# Patient Record
Sex: FEMALE | Race: WHITE | Employment: UNEMPLOYED | URBAN - METROPOLITAN AREA
[De-identification: names, ages, dates, MRNs, and addresses within clinical notes are randomized per-mention and may not be internally consistent; named-entity substitution may affect disease eponyms.]

---

## 2023-04-13 LAB
BASOPHILS (10*3/UL) IN BLOOD BY AUTOMATED COUNT: 0.1 X10E9/L (ref 0–0.1)
BASOPHILS/100 LEUKOCYTES IN BLOOD BY AUTOMATED COUNT: 0.7 % (ref 0–2)
EOSINOPHILS (10*3/UL) IN BLOOD BY AUTOMATED COUNT: 0.17 X10E9/L (ref 0–0.4)
EOSINOPHILS/100 LEUKOCYTES IN BLOOD BY AUTOMATED COUNT: 1.3 % (ref 0–6)
ERYTHROCYTE DISTRIBUTION WIDTH (RATIO) BY AUTOMATED COUNT: 14.6 % (ref 11.5–14.5)
ERYTHROCYTE MEAN CORPUSCULAR HEMOGLOBIN CONCENTRATION (G/DL) BY AUTOMATED: 31.2 G/DL (ref 32–36)
ERYTHROCYTE MEAN CORPUSCULAR VOLUME (FL) BY AUTOMATED COUNT: 93 FL (ref 80–100)
ERYTHROCYTES (10*6/UL) IN BLOOD BY AUTOMATED COUNT: 4.29 X10E12/L (ref 4–5.2)
HEMATOCRIT (%) IN BLOOD BY AUTOMATED COUNT: 39.7 % (ref 36–46)
HEMOGLOBIN (G/DL) IN BLOOD: 12.4 G/DL (ref 12–16)
IGA (MG/DL) IN SER/PLAS: 212 MG/DL (ref 70–400)
IGE (IU/L) IN SERUM OR PLASMA: 118 IU/ML (ref 0–214)
IGG (MG/DL) IN SER/PLAS: 782 MG/DL (ref 700–1600)
IGM (MG/DL) IN SER/PLAS: 115 MG/DL (ref 40–230)
IMMATURE GRANULOCYTES/100 LEUKOCYTES IN BLOOD BY AUTOMATED COUNT: 1.2 % (ref 0–0.9)
LEUKOCYTES (10*3/UL) IN BLOOD BY AUTOMATED COUNT: 13.6 X10E9/L (ref 4.4–11.3)
LYMPHOCYTES (10*3/UL) IN BLOOD BY AUTOMATED COUNT: 4.7 X10E9/L (ref 0.8–3)
LYMPHOCYTES/100 LEUKOCYTES IN BLOOD BY AUTOMATED COUNT: 34.6 % (ref 13–44)
MONOCYTES (10*3/UL) IN BLOOD BY AUTOMATED COUNT: 1.14 X10E9/L (ref 0.05–0.8)
MONOCYTES/100 LEUKOCYTES IN BLOOD BY AUTOMATED COUNT: 8.4 % (ref 2–10)
NEUTROPHILS (10*3/UL) IN BLOOD BY AUTOMATED COUNT: 7.32 X10E9/L (ref 1.6–5.5)
NEUTROPHILS/100 LEUKOCYTES IN BLOOD BY AUTOMATED COUNT: 53.8 % (ref 40–80)
NRBC (PER 100 WBCS) BY AUTOMATED COUNT: 0 /100 WBC (ref 0–0)
PLATELETS (10*3/UL) IN BLOOD AUTOMATED COUNT: 390 X10E9/L (ref 150–450)

## 2023-04-17 LAB
PNEUMO SEROTYPE INTERPRETATION: NORMAL
SEROTYPE 1, VIRC: 1.35 UG/ML
SEROTYPE 12F, VIRC: 0.2 UG/ML
SEROTYPE 14, VIRC: 6.8 UG/ML
SEROTYPE 18C(56), VIRC: 2.99 UG/ML
SEROTYPE 19F, VIRC: 1.25 UG/ML
SEROTYPE 23F, VIRC: 0.03 UG/ML
SEROTYPE 3, VIRC: 0.37 UG/ML
SEROTYPE 4, VIRC: 0.09 UG/ML
SEROTYPE 5, VIRC: 1.4 UG/ML
SEROTYPE 6B(26), VIRC: 2.14 UG/ML
SEROTYPE 7F(51), VIRC: 0.88 UG/ML
SEROTYPE 8, VIRC: 0.23 UG/ML
SEROTYPE 9N, VIRC: 0.68 UG/ML
SEROTYPE 9V(68), VIRC: 0.56 UG/ML

## 2023-04-21 LAB
ASPER.FUM.MIX GEL DIFFUSION, VIRC: POSITIVE
ASPER.FUMIGATUS IGE, VIRC: 5.48 KU/L
ASPER.FUMIGATUS IGG, VIRC: 17.2 MCG/ML
CLASS ASPER.FUMIGATUS, VIRC: 3
IGE, VIRC: 131 IU/ML (ref 3–48)

## 2023-05-05 LAB
ALANINE AMINOTRANSFERASE (SGPT) (U/L) IN SER/PLAS: 20 U/L (ref 7–45)
ALBUMIN (G/DL) IN SER/PLAS: 4 G/DL (ref 3.4–5)
ALKALINE PHOSPHATASE (U/L) IN SER/PLAS: 50 U/L (ref 33–136)
ANION GAP IN SER/PLAS: 10 MMOL/L (ref 10–20)
ASPARTATE AMINOTRANSFERASE (SGOT) (U/L) IN SER/PLAS: 19 U/L (ref 9–39)
BILIRUBIN TOTAL (MG/DL) IN SER/PLAS: 0.3 MG/DL (ref 0–1.2)
CALCIDIOL (25 OH VITAMIN D3) (NG/ML) IN SER/PLAS: 37 NG/ML
CALCIUM (MG/DL) IN SER/PLAS: 9.5 MG/DL (ref 8.6–10.6)
CARBON DIOXIDE, TOTAL (MMOL/L) IN SER/PLAS: 28 MMOL/L (ref 21–32)
CHLORIDE (MMOL/L) IN SER/PLAS: 104 MMOL/L (ref 98–107)
CREATININE (MG/DL) IN SER/PLAS: 0.79 MG/DL (ref 0.5–1.05)
ERYTHROCYTE DISTRIBUTION WIDTH (RATIO) BY AUTOMATED COUNT: 15.1 % (ref 11.5–14.5)
ERYTHROCYTE MEAN CORPUSCULAR HEMOGLOBIN CONCENTRATION (G/DL) BY AUTOMATED: 31.9 G/DL (ref 32–36)
ERYTHROCYTE MEAN CORPUSCULAR VOLUME (FL) BY AUTOMATED COUNT: 92 FL (ref 80–100)
ERYTHROCYTES (10*6/UL) IN BLOOD BY AUTOMATED COUNT: 3.53 X10E12/L (ref 4–5.2)
GFR FEMALE: 78 ML/MIN/1.73M2
GLUCOSE (MG/DL) IN SER/PLAS: 89 MG/DL (ref 74–99)
HEMATOCRIT (%) IN BLOOD BY AUTOMATED COUNT: 32.6 % (ref 36–46)
HEMOGLOBIN (G/DL) IN BLOOD: 10.4 G/DL (ref 12–16)
LEUKOCYTES (10*3/UL) IN BLOOD BY AUTOMATED COUNT: 7.7 X10E9/L (ref 4.4–11.3)
NRBC (PER 100 WBCS) BY AUTOMATED COUNT: 0 /100 WBC (ref 0–0)
PLATELETS (10*3/UL) IN BLOOD AUTOMATED COUNT: 235 X10E9/L (ref 150–450)
POTASSIUM (MMOL/L) IN SER/PLAS: 3.9 MMOL/L (ref 3.5–5.3)
PROTEIN TOTAL: 6 G/DL (ref 6.4–8.2)
SODIUM (MMOL/L) IN SER/PLAS: 138 MMOL/L (ref 136–145)
UREA NITROGEN (MG/DL) IN SER/PLAS: 11 MG/DL (ref 6–23)

## 2023-08-25 ENCOUNTER — APPOINTMENT (OUTPATIENT)
Dept: PRIMARY CARE | Facility: CLINIC | Age: 78
End: 2023-08-25
Payer: MEDICARE

## 2023-09-05 ENCOUNTER — APPOINTMENT (OUTPATIENT)
Dept: PRIMARY CARE | Facility: CLINIC | Age: 78
End: 2023-09-05
Payer: MEDICARE

## 2023-09-07 ENCOUNTER — OFFICE VISIT (OUTPATIENT)
Dept: PRIMARY CARE | Facility: CLINIC | Age: 78
End: 2023-09-07
Payer: MEDICARE

## 2023-09-07 VITALS — DIASTOLIC BLOOD PRESSURE: 76 MMHG | SYSTOLIC BLOOD PRESSURE: 131 MMHG

## 2023-09-07 DIAGNOSIS — K21.9 GASTROESOPHAGEAL REFLUX DISEASE WITHOUT ESOPHAGITIS: ICD-10-CM

## 2023-09-07 DIAGNOSIS — J30.1 ALLERGIC RHINITIS DUE TO POLLEN, UNSPECIFIED SEASONALITY: ICD-10-CM

## 2023-09-07 DIAGNOSIS — G25.0 BENIGN ESSENTIAL TREMOR: ICD-10-CM

## 2023-09-07 DIAGNOSIS — L57.8 SOLAR DERMATITIS: Primary | ICD-10-CM

## 2023-09-07 PROCEDURE — 1126F AMNT PAIN NOTED NONE PRSNT: CPT | Performed by: INTERNAL MEDICINE

## 2023-09-07 PROCEDURE — 99203 OFFICE O/P NEW LOW 30 MIN: CPT | Performed by: INTERNAL MEDICINE

## 2023-09-07 NOTE — PROGRESS NOTES
Subjective   Patient ID: Juan Alberto Alcantara is a 77 y.o. female who presents for No chief complaint on file..    HPI met patient for first time see updated front sheet has had multiple issues regarding her breathing sees Dr. TINAJERO is on a regimen of Advair daily Spiriva Allegra also on oral medications for her stomach is concerned about her blood sugar and her cholesterol no chest pain no shortness of breath    Past medical history asthma GERD fatty liver  Hyperlipidemia depression osteoporosis    Medications noted and unchanged albuterol prolia fluoxetine advair protonix crestor    Allergies noted and unchanged penicillin    Social history no tobacco    Prevention some exercise some prior blood work reviewed    Review of Systems    Objective   There were no vitals taken for this visit.  Vital signs noted alert and oriented x3 NCAT no coryza nares without discharge OP benign no JVD no thyromegaly chest clear to auscultation and percussion no wheezing CV regular rate and rhythm S1-S2 without murmur gallop or rub extremities no clubbing cyanosis or edema normal distal pulses musculoskeletal resting tremor in her left upper extremity  Physical Exam    Assessment/Plan impression all diagnoses benign essential tremor GERD  Plan we will reschedule for complete physical examination and blood work (some completed 05.23 and some 12.22) watch diet avoid nsaids continue with ppi no particular intervention is needed for tremor, but will follow up with neuro see below avoid excess caffeine   Referral to ENT referral to dermatology referral to plastic surgery referral for neurology then recheck based on above

## 2023-10-02 ENCOUNTER — APPOINTMENT (OUTPATIENT)
Dept: PRIMARY CARE | Facility: CLINIC | Age: 78
End: 2023-10-02
Payer: MEDICARE

## 2023-10-06 PROBLEM — K76.0 FATTY LIVER: Status: ACTIVE | Noted: 2023-10-06

## 2023-10-06 PROBLEM — H25.13 NUCLEAR SENILE CATARACT OF BOTH EYES: Status: ACTIVE | Noted: 2021-05-03

## 2023-10-06 PROBLEM — K21.9 GASTRO-ESOPHAGEAL REFLUX DISEASE WITHOUT ESOPHAGITIS: Status: ACTIVE | Noted: 2018-12-31

## 2023-10-06 PROBLEM — H43.813 POSTERIOR VITREOUS DETACHMENT OF BOTH EYES: Status: ACTIVE | Noted: 2021-05-03

## 2023-10-06 PROBLEM — F33.41 RECURRENT MAJOR DEPRESSIVE DISORDER, IN PARTIAL REMISSION (CMS-HCC): Status: ACTIVE | Noted: 2018-05-23

## 2023-10-06 PROBLEM — H35.373 EPIRETINAL MEMBRANE (ERM) OF BOTH EYES: Status: ACTIVE | Noted: 2021-05-03

## 2023-10-06 PROBLEM — H90.A31 MIXED CONDUCTIVE AND SENSORINEURAL HEARING LOSS OF RIGHT EAR WITH RESTRICTED HEARING OF LEFT EAR: Status: ACTIVE | Noted: 2018-10-01

## 2023-10-06 PROBLEM — N39.0 RECURRENT UTI: Status: ACTIVE | Noted: 2023-10-06

## 2023-10-06 PROBLEM — E66.9 OBESITY, CLASS I, BMI 30-34.9: Status: ACTIVE | Noted: 2018-10-24

## 2023-10-06 PROBLEM — F32.9 MAJOR DEPRESSIVE DISORDER, SINGLE EPISODE, UNSPECIFIED: Status: ACTIVE | Noted: 2018-12-31

## 2023-10-06 PROBLEM — M51.24 HERNIATION OF THORACIC INTERVERTEBRAL DISC WITHOUT MYELOPATHY: Status: ACTIVE | Noted: 2021-06-09

## 2023-10-06 PROBLEM — H16.223 KERATOCONJUNCTIVITIS SICCA OF BOTH EYES NOT SPECIFIED AS SJOGREN'S: Status: ACTIVE | Noted: 2021-05-03

## 2023-10-06 PROBLEM — F32.A DEPRESSION: Status: ACTIVE | Noted: 2023-10-06

## 2023-10-06 PROBLEM — R73.03 PREDIABETES: Status: ACTIVE | Noted: 2023-10-06

## 2023-10-06 PROBLEM — C44.90 SKIN CANCER: Status: ACTIVE | Noted: 2023-10-06

## 2023-10-06 PROBLEM — H52.4 PRESBYOPIA: Status: ACTIVE | Noted: 2021-05-03

## 2023-10-06 PROBLEM — E66.811 OBESITY, CLASS I, BMI 30-34.9: Status: ACTIVE | Noted: 2018-10-24

## 2023-10-06 PROBLEM — J45.909 ASTHMA (HHS-HCC): Status: ACTIVE | Noted: 2023-10-06

## 2023-10-06 RX ORDER — TRETINOIN 0.5 MG/G
CREAM TOPICAL NIGHTLY
COMMUNITY
Start: 2022-12-08 | End: 2024-01-17 | Stop reason: WASHOUT

## 2023-10-06 RX ORDER — AZELASTINE 1 MG/ML
2 SPRAY, METERED NASAL 2 TIMES DAILY
COMMUNITY
Start: 2023-05-24

## 2023-10-06 RX ORDER — FLUOXETINE HYDROCHLORIDE 20 MG/1
20 CAPSULE ORAL DAILY
COMMUNITY
End: 2024-01-18 | Stop reason: ALTCHOICE

## 2023-10-06 RX ORDER — DICYCLOMINE HYDROCHLORIDE 10 MG/1
1 CAPSULE ORAL 3 TIMES DAILY PRN
COMMUNITY
Start: 2022-11-01 | End: 2024-01-17 | Stop reason: WASHOUT

## 2023-10-06 RX ORDER — DENOSUMAB 60 MG/ML
INJECTION SUBCUTANEOUS
COMMUNITY

## 2023-10-06 RX ORDER — PANTOPRAZOLE SODIUM 40 MG/1
1 TABLET, DELAYED RELEASE ORAL DAILY
COMMUNITY
Start: 2016-06-03

## 2023-10-06 RX ORDER — NITROFURANTOIN 25; 75 MG/1; MG/1
1 CAPSULE ORAL
COMMUNITY
Start: 2023-01-22 | End: 2023-10-17 | Stop reason: SDUPTHER

## 2023-10-06 RX ORDER — FLUTICASONE PROPIONATE AND SALMETEROL 100; 50 UG/1; UG/1
1 POWDER RESPIRATORY (INHALATION)
COMMUNITY
Start: 2014-09-29 | End: 2024-01-17 | Stop reason: WASHOUT

## 2023-10-06 RX ORDER — ROSUVASTATIN CALCIUM 20 MG/1
1 TABLET, COATED ORAL DAILY
COMMUNITY
End: 2024-01-18 | Stop reason: SDUPTHER

## 2023-10-06 RX ORDER — RALOXIFENE HYDROCHLORIDE 60 MG/1
60 TABLET, FILM COATED ORAL DAILY
COMMUNITY
End: 2024-01-17 | Stop reason: WASHOUT

## 2023-10-06 RX ORDER — FLUTICASONE PROPIONATE AND SALMETEROL 50; 250 UG/1; UG/1
1 POWDER RESPIRATORY (INHALATION)
COMMUNITY

## 2023-10-06 RX ORDER — MINERAL OIL
1 ENEMA (ML) RECTAL DAILY
COMMUNITY
Start: 2014-09-29

## 2023-10-06 RX ORDER — FLUTICASONE FUROATE 27.5 UG/1
2 SPRAY, METERED NASAL
COMMUNITY
End: 2024-01-17 | Stop reason: WASHOUT

## 2023-10-06 RX ORDER — METRONIDAZOLE 7.5 MG/G
CREAM TOPICAL
COMMUNITY
End: 2024-01-17 | Stop reason: WASHOUT

## 2023-10-06 RX ORDER — LEVOFLOXACIN 500 MG/1
1 TABLET, FILM COATED ORAL DAILY
COMMUNITY
Start: 2009-03-20 | End: 2024-01-17 | Stop reason: WASHOUT

## 2023-10-06 RX ORDER — MULTIVIT-MIN/FA/LYCOPEN/LUTEIN .4-300-25
TABLET ORAL
COMMUNITY

## 2023-10-06 RX ORDER — GUAIFENESIN 600 MG/1
2 TABLET, EXTENDED RELEASE ORAL EVERY 12 HOURS
COMMUNITY
Start: 2009-03-19 | End: 2024-01-17 | Stop reason: WASHOUT

## 2023-10-06 RX ORDER — ESTRADIOL 0.1 MG/G
CREAM VAGINAL 3 TIMES WEEKLY
COMMUNITY
Start: 2022-11-08 | End: 2024-01-17 | Stop reason: WASHOUT

## 2023-10-06 RX ORDER — FLUOXETINE HYDROCHLORIDE 40 MG/1
1 CAPSULE ORAL DAILY
COMMUNITY
End: 2023-10-13 | Stop reason: SDUPTHER

## 2023-10-06 RX ORDER — ALBUTEROL SULFATE 90 UG/1
2 AEROSOL, METERED RESPIRATORY (INHALATION) EVERY 4 HOURS PRN
COMMUNITY
Start: 2014-12-16

## 2023-10-13 DIAGNOSIS — Z00.00 HEALTH CARE MAINTENANCE: Primary | ICD-10-CM

## 2023-10-14 RX ORDER — FLUOXETINE HYDROCHLORIDE 40 MG/1
40 CAPSULE ORAL DAILY
Qty: 90 CAPSULE | Refills: 0 | Status: SHIPPED | OUTPATIENT
Start: 2023-10-14 | End: 2023-10-16 | Stop reason: SDUPTHER

## 2023-10-16 DIAGNOSIS — Z00.00 HEALTH CARE MAINTENANCE: ICD-10-CM

## 2023-10-16 RX ORDER — FLUOXETINE HYDROCHLORIDE 40 MG/1
40 CAPSULE ORAL DAILY
Qty: 90 CAPSULE | Refills: 0 | Status: SHIPPED | OUTPATIENT
Start: 2023-10-16 | End: 2024-01-18 | Stop reason: SDUPTHER

## 2023-10-17 DIAGNOSIS — N39.0 RECURRENT UTI: ICD-10-CM

## 2023-10-18 ENCOUNTER — LAB (OUTPATIENT)
Dept: LAB | Facility: LAB | Age: 78
End: 2023-10-18
Payer: MEDICARE

## 2023-10-18 DIAGNOSIS — N39.0 RECURRENT UTI: ICD-10-CM

## 2023-10-18 DIAGNOSIS — R19.7 DIARRHEA, UNSPECIFIED TYPE: ICD-10-CM

## 2023-10-18 PROCEDURE — 87086 URINE CULTURE/COLONY COUNT: CPT

## 2023-10-18 PROCEDURE — 87186 SC STD MICRODIL/AGAR DIL: CPT

## 2023-10-18 RX ORDER — NITROFURANTOIN 25; 75 MG/1; MG/1
100 CAPSULE ORAL 2 TIMES DAILY
Qty: 14 CAPSULE | Refills: 0 | Status: SHIPPED | OUTPATIENT
Start: 2023-10-18 | End: 2023-10-25

## 2023-10-20 LAB — BACTERIA UR CULT: ABNORMAL

## 2023-10-23 ENCOUNTER — APPOINTMENT (OUTPATIENT)
Dept: PRIMARY CARE | Facility: CLINIC | Age: 78
End: 2023-10-23
Payer: MEDICARE

## 2023-10-26 ENCOUNTER — APPOINTMENT (OUTPATIENT)
Dept: PRIMARY CARE | Facility: CLINIC | Age: 78
End: 2023-10-26
Payer: MEDICARE

## 2023-10-26 DIAGNOSIS — N39.0 RECURRENT UTI: ICD-10-CM

## 2023-10-26 DIAGNOSIS — M81.0 OSTEOPOROSIS, UNSPECIFIED OSTEOPOROSIS TYPE, UNSPECIFIED PATHOLOGICAL FRACTURE PRESENCE: ICD-10-CM

## 2023-10-26 DIAGNOSIS — M89.9 DISORDER OF BONE, UNSPECIFIED: ICD-10-CM

## 2023-10-27 RX ORDER — CIPROFLOXACIN 500 MG/1
500 TABLET ORAL 2 TIMES DAILY
Qty: 14 TABLET | Refills: 0 | Status: SHIPPED | OUTPATIENT
Start: 2023-10-27 | End: 2023-11-03

## 2023-11-02 ENCOUNTER — LAB (OUTPATIENT)
Dept: LAB | Facility: LAB | Age: 78
End: 2023-11-02
Payer: MEDICARE

## 2023-11-02 DIAGNOSIS — M89.9 DISORDER OF BONE, UNSPECIFIED: ICD-10-CM

## 2023-11-02 DIAGNOSIS — M81.0 OSTEOPOROSIS, UNSPECIFIED OSTEOPOROSIS TYPE, UNSPECIFIED PATHOLOGICAL FRACTURE PRESENCE: ICD-10-CM

## 2023-11-02 LAB
25(OH)D3 SERPL-MCNC: 28 NG/ML (ref 30–100)
ALBUMIN SERPL BCP-MCNC: 4.4 G/DL (ref 3.4–5)
ALP SERPL-CCNC: 62 U/L (ref 33–136)
ALT SERPL W P-5'-P-CCNC: 22 U/L (ref 7–45)
ANION GAP SERPL CALC-SCNC: 14 MMOL/L (ref 10–20)
AST SERPL W P-5'-P-CCNC: 19 U/L (ref 9–39)
BILIRUB SERPL-MCNC: 0.4 MG/DL (ref 0–1.2)
BUN SERPL-MCNC: 17 MG/DL (ref 6–23)
CALCIUM SERPL-MCNC: 9.5 MG/DL (ref 8.6–10.6)
CHLORIDE SERPL-SCNC: 104 MMOL/L (ref 98–107)
CO2 SERPL-SCNC: 25 MMOL/L (ref 21–32)
CREAT SERPL-MCNC: 0.79 MG/DL (ref 0.5–1.05)
ERYTHROCYTE [DISTWIDTH] IN BLOOD BY AUTOMATED COUNT: 13.2 % (ref 11.5–14.5)
GFR SERPL CREATININE-BSD FRML MDRD: 78 ML/MIN/1.73M*2
GLUCOSE SERPL-MCNC: 94 MG/DL (ref 74–99)
HCT VFR BLD AUTO: 36.8 % (ref 36–46)
HGB BLD-MCNC: 11.6 G/DL (ref 12–16)
MCH RBC QN AUTO: 30.1 PG (ref 26–34)
MCHC RBC AUTO-ENTMCNC: 31.5 G/DL (ref 32–36)
MCV RBC AUTO: 96 FL (ref 80–100)
NRBC BLD-RTO: 0 /100 WBCS (ref 0–0)
PLATELET # BLD AUTO: 281 X10*3/UL (ref 150–450)
POTASSIUM SERPL-SCNC: 4.4 MMOL/L (ref 3.5–5.3)
PROT SERPL-MCNC: 6.8 G/DL (ref 6.4–8.2)
RBC # BLD AUTO: 3.85 X10*6/UL (ref 4–5.2)
SODIUM SERPL-SCNC: 139 MMOL/L (ref 136–145)
WBC # BLD AUTO: 8.5 X10*3/UL (ref 4.4–11.3)

## 2023-11-02 PROCEDURE — 80053 COMPREHEN METABOLIC PANEL: CPT

## 2023-11-02 PROCEDURE — 82306 VITAMIN D 25 HYDROXY: CPT

## 2023-11-02 PROCEDURE — 85027 COMPLETE CBC AUTOMATED: CPT

## 2023-11-02 PROCEDURE — 36415 COLL VENOUS BLD VENIPUNCTURE: CPT

## 2023-12-13 NOTE — PROGRESS NOTES
FOLLOW-UP VISIT     HISTORY OF PRESENT ILLNESS:   Juan Alberto Alcantara is a 78 y.o. female who was last seen 23 presents for follow up today. At her last visit we discussed using d-mannose and vaginal estrogen for her UTIs, she declined to use estrogen at that time.     She has been feeling well. She explains that she has had to call us to have her antibiotic prescribed for a UTI. She is still concerned with estrogen and breast cancer.     She also has hemorrhoids that do make her voiding painful. Her stools are not hard but she feels that she goes through cycles where her bowel movements become hard, but nothing like the constipation she felt 20 years ago. Toward the end of the cycle she ends up with terrible diarrhea.       PAST MEDICAL HISTORY:  Past Medical History:   Diagnosis Date    Acute vaginitis 2014    Bacterial vaginosis    Encounter for other preprocedural examination 2018    Preoperative testing    Other abnormal and inconclusive findings on diagnostic imaging of breast 2017    Abnormal finding on breast imaging    Other specified disorders of breast 2019    Radial scar of breast    Personal history of other benign neoplasm 2019    History of other benign neoplasm    Personal history of other diseases of the respiratory system 2014    Personal history of asthma    Personal history of other malignant neoplasm of skin 2014    History of squamous cell carcinoma of skin    Personal history of other malignant neoplasm of skin 2014    History of basal cell carcinoma    Personal history of other specified conditions 12/15/2014    History of dysuria       PAST SURGICAL HISTORY:  Past Surgical History:   Procedure Laterality Date     SECTION, CLASSIC  2014     Section    HYSTERECTOMY  2014    Hysterectomy    OTHER SURGICAL HISTORY  2019    Breast biopsy excisional    OTHER SURGICAL HISTORY  2019    Breast biopsy core needle     SINUS SURGERY  09/12/2014    Sinus Surgery    TOTAL ABDOMINAL HYSTERECTOMY W/ BILATERAL SALPINGOOPHORECTOMY  09/12/2014    Salpingo-oophorectomy Bilateral        ALLERGIES:   Allergies   Allergen Reactions    Adhesive Tape-Silicones Other and Rash     Skin becomes red and painful    Hydromorphone (Bulk) Other     Upset stomach  Vomiting    Penicillins Unknown and Rash     Ineffective and does not feel well    House Dust Unknown    Hydromorphone Other    Mold Unknown        MEDICATIONS:   [unfilled]     SOCIAL HISTORY:  Patient     Social History     Socioeconomic History    Marital status:      Spouse name: Not on file    Number of children: Not on file    Years of education: Not on file    Highest education level: Not on file   Occupational History    Not on file   Tobacco Use    Smoking status: Not on file    Smokeless tobacco: Not on file   Substance and Sexual Activity    Alcohol use: Not on file    Drug use: Not on file    Sexual activity: Not on file   Other Topics Concern    Not on file   Social History Narrative    Not on file     Social Determinants of Health     Financial Resource Strain: Not on file   Food Insecurity: Not on file   Transportation Needs: Not on file   Physical Activity: Not on file   Stress: Not on file   Social Connections: Not on file   Intimate Partner Violence: Not on file   Housing Stability: Not on file       FAMILY HISTORY:  Family History   Problem Relation Name Age of Onset    Breast cancer Mother      Other (Cardiac Disorder) Father      Colon cancer Maternal Grandfather      Breast cancer Other Paternal Aunt     Lung cancer Sibling      Thyroid cancer Sibling         REVIEW OF SYSTEMS:  Constitutional: Negative for fever and chills. Denies anorexia, weight loss.  Eyes: Negative for visual disturbance.   Respiratory: Negative for shortness of breath.    Cardiovascular: Negative for chest pain.   Gastrointestinal: Negative for nausea and vomiting.   Genitourinary: See  interval history above.  Skin: Negative for rash.   Neurological: Negative for dizziness and numbness.   Psychiatric/Behavioral: Negative for confusion and decreased concentration.     PHYSICAL EXAM:  There were no vitals taken for this visit.  Constitutional: Patient appears well-developed and well-nourished. No distress.    Head: Normocephalic and atraumatic.    Neck: Normal range of motion.    Cardiovascular: Normal rate.    Pulmonary/Chest: Effort normal. No respiratory distress.     Musculoskeletal: Normal range of motion.    Neurological: Alert and oriented to person, place, and time.  Psychiatric: Normal mood and affect. Behavior is normal. Thought content normal.      : atrophic vaginal mucosa         Assessment:      No diagnosis found.    Juan Alberto Alcantara is a 78 y.o. female with recurrent UTIs      She is doing well. We made sure she has a standing order for a UA and refilled her prophylactic macrobid. She also requested referrals to a new internal medicine and ENT today.     Plan:   -Refilled prophylactic macrobid  -Standing UA    Follow up will be scheduled appropriately.     Scribe Attestation  By signing my name below, IClaire Scribe   attest that this documentation has been prepared under the direction and in the presence of Serina Matthew MD MPH.

## 2023-12-14 ENCOUNTER — OFFICE VISIT (OUTPATIENT)
Dept: UROLOGY | Facility: CLINIC | Age: 78
End: 2023-12-14
Payer: MEDICARE

## 2023-12-14 VITALS
TEMPERATURE: 97.7 F | DIASTOLIC BLOOD PRESSURE: 79 MMHG | HEART RATE: 87 BPM | SYSTOLIC BLOOD PRESSURE: 115 MMHG | BODY MASS INDEX: 28.34 KG/M2 | WEIGHT: 154 LBS | HEIGHT: 62 IN

## 2023-12-14 DIAGNOSIS — Z00.00 HEALTH CARE MAINTENANCE: ICD-10-CM

## 2023-12-14 DIAGNOSIS — Z01.419 WELL WOMAN EXAM: ICD-10-CM

## 2023-12-14 DIAGNOSIS — N39.0 RECURRENT UTI: Primary | ICD-10-CM

## 2023-12-14 LAB
POC APPEARANCE, URINE: CLEAR
POC BILIRUBIN, URINE: NEGATIVE
POC BLOOD, URINE: NEGATIVE
POC COLOR, URINE: YELLOW
POC GLUCOSE, URINE: NEGATIVE MG/DL
POC KETONES, URINE: NEGATIVE MG/DL
POC LEUKOCYTES, URINE: ABNORMAL
POC NITRITE,URINE: NEGATIVE
POC PH, URINE: 5 PH
POC PROTEIN, URINE: NEGATIVE MG/DL
POC SPECIFIC GRAVITY, URINE: 1.02
POC UROBILINOGEN, URINE: 0.2 EU/DL

## 2023-12-14 PROCEDURE — 1036F TOBACCO NON-USER: CPT | Performed by: OBSTETRICS & GYNECOLOGY

## 2023-12-14 PROCEDURE — 81002 URINALYSIS NONAUTO W/O SCOPE: CPT | Performed by: OBSTETRICS & GYNECOLOGY

## 2023-12-14 PROCEDURE — 99215 OFFICE O/P EST HI 40 MIN: CPT | Performed by: OBSTETRICS & GYNECOLOGY

## 2023-12-14 PROCEDURE — 1126F AMNT PAIN NOTED NONE PRSNT: CPT | Performed by: OBSTETRICS & GYNECOLOGY

## 2023-12-14 RX ORDER — NITROFURANTOIN 25; 75 MG/1; MG/1
CAPSULE ORAL
Qty: 60 CAPSULE | Refills: 0 | Status: SHIPPED | OUTPATIENT
Start: 2023-12-14

## 2023-12-14 ASSESSMENT — PAIN SCALES - GENERAL: PAINLEVEL: 0-NO PAIN

## 2023-12-26 ENCOUNTER — APPOINTMENT (OUTPATIENT)
Dept: SURGICAL ONCOLOGY | Facility: CLINIC | Age: 78
End: 2023-12-26
Payer: MEDICARE

## 2023-12-26 ENCOUNTER — APPOINTMENT (OUTPATIENT)
Dept: RADIOLOGY | Facility: CLINIC | Age: 78
End: 2023-12-26
Payer: MEDICARE

## 2023-12-27 NOTE — PROGRESS NOTES
Juan Alberto Alcantara female   1945 78 y.o.   [id number]           HPI  Juan Alberto Alcantara is a 78 y.o. 77 year old  [facility]. 2016 right breast ultrasound core biopsy evidenced intraductal papilloma, sclerosing adenosis and apocrine metaplasia. This was not initially surgically excised. Followed imaging was performed 2017 & 2017 when it was decided she wanted to proceed with excision biopsy performed by Dr Denis Pimentel 2017 showing complex sclerosing lesion, usual ductal hyperplasia and apocrine metaplasia without atypia. 2018 right ultrasound core biopsy of subareolar mass noted sclerosing lesion, with intraductal papilloma, usual ductal hyperplasia & apocrine metaplasia. 2018 right magseed excision with Denis Pimentel noted intraductal papilloma, flat epithelial atypia and PASH. She has history of remote breast benign biopsy. She took Raloxifene for 12 year for bone loss. She has family history of breast cancer in her mother age 42 and 2 paternal aunts & paternal niece.     She wants to continue [facility] care in the [facility] with clinical breast exams.      BREAST IMAGIN2022 Screening mammogram, BI-RADS Category 2.     REPRODUCTIVE HISTORY: Menarche age 13, first birth age 30, menopause age 57, MOE/BSO, no HRT, >1 benign breast biopsies, scattered fibroglandular breast tissue     FAMILY CANCER HISTORY:  Mother: breast cancer age 42  Paternal aunt (1): breast cancer  Paternal aunt (2): breast cancer  Paternal niece: breast cancer  Maternal grandfather: colon cancer   Brother: lung cancer       REVIEW OF SYSTEMS    Constitutional:  Negative for appetite change, fatigue, fever and unexpected weight change.   HENT:  Negative for ear pain, hearing loss, nosebleeds, sore throat and trouble swallowing.    Eyes:  Negative for discharge, itching and visual disturbance.   Respiratory:  Negative for cough, chest  tightness and shortness of breath.    Cardiovascular:  Negative for chest pain, palpitations and leg swelling.   Breast: as indicated in HPI  Gastrointestinal:  Negative for abdominal pain, constipation, diarrhea and nausea.   Endocrine: Negative for cold intolerance and heat intolerance.   Genitourinary:  Negative for dysuria, frequency, hematuria, pelvic pain and vaginal bleeding.   Musculoskeletal:  Negative for arthralgias, back pain, gait problem, joint swelling and myalgias.   Skin:  Negative for color change and rash.   Allergic/Immunologic: Negative for environmental allergies and food allergies.   Neurological:  Negative for dizziness, tremors, speech difficulty, weakness, numbness and headaches.   Hematological:  Does not bruise/bleed easily.   Psychiatric/Behavioral:  Negative for agitation, dysphoric mood and sleep disturbance. The patient is not nervous/anxious.         MEDICATIONS  Current Outpatient Medications   Medication Instructions    Advair Diskus 250-50 mcg/dose diskus inhaler 1 puff, inhalation, 2 times daily RT    albuterol 90 mcg/actuation inhaler 2 puffs, inhalation, Every 4 hours PRN, As instructed    azelastine (Astelin) 137 mcg (0.1 %) nasal spray 2 sprays, Each Nostril, 2 times daily    D-MANNOSE ORAL 1 capsule, oral, Daily, 350 mg    denosumab (Prolia) 60 mg/mL syringe subcutaneous    dicyclomine (Bentyl) 10 mg capsule 1 capsule, oral, 3 times daily PRN    estradiol (Estrace) 0.01 % (0.1 mg/gram) vaginal cream vaginal, 3 times weekly, APPLY A PEA-SIZED AMOUNT TO VAGINAL OPENING EVERY MONDAY, WEDNESDAY, AND FRIDAY EVENING.<BR>    fexofenadine (Allegra) 180 mg tablet 1 tablet, oral, Daily    FLUoxetine (PROZAC) 20 mg, oral, Daily    FLUoxetine (PROZAC) 40 mg, oral, Daily    fluticasone (Flonase Sensimist) 27.5 mcg/actuation nasal spray 2 sprays, Each Nostril, Daily RT    fluticasone propion-salmeteroL (Advair Diskus) 100-50 mcg/dose diskus inhaler 1 puff, inhalation, 2 times daily RT,  Rinse and gargle mouth with water after each use. Uses 250 mcg/dose now.<BR>    guaiFENesin (Mucinex) 600 mg 12 hr tablet 2 tablets, oral, Every 12 hours    levoFLOXacin (Levaquin) 500 mg tablet 1 tablet, oral, Daily    LYCOPENE ORAL Centrum Silver    metroNIDAZOLE (Metrocream) 0.75 % cream Topical    multivitamin with minerals iron-free (Centrum Silver) oral    nitrofurantoin, macrocrystal-monohydrate, (Macrobid) 100 mg capsule Take one tablet PRN (diarrhea) to prevent UTI    pantoprazole (ProtoNix) 40 mg EC tablet 1 tablet, oral, Daily, Take 1 hour before breakfast     raloxifene (EVISTA) 60 mg, oral, Daily    rosuvastatin (Crestor) 20 mg tablet 1 tablet, oral, Daily    tretinoin (Retin-A) 0.05 % cream Topical, Nightly, Apply to affected areas. Start 3 nights a week and increase use as tolerated        ALLERGIES  Allergies   Allergen Reactions    Adhesive Tape-Silicones Other and Rash     Skin becomes red and painful    Hydromorphone (Bulk) Other     Upset stomach  Vomiting    Penicillins Unknown and Rash     Ineffective and does not feel well    House Dust Unknown    Hydromorphone Other    Mold Unknown        SOCIAL HISTORY    Family History   Problem Relation Name Age of Onset    Breast cancer Mother      Other (Cardiac Disorder) Father      Colon cancer Maternal Grandfather      Breast cancer Other Paternal Aunt     Lung cancer Sibling      Thyroid cancer Sibling           Social History     Tobacco Use    Smoking status: Never    Smokeless tobacco: Never   Substance Use Topics    Alcohol use: Never        VITALS  There were no vitals filed for this visit.     PHYSICAL EXAM  Patient is alert and oriented x3, with appropriate mood. Her gait is steady and hand grasps are equal. Sclera clear. The breasts are asymmetrical, left with more ptosis. Right breast has healed partial circumareolar incision from excisional biopsy. The tissue is soft without palpable abnormalities, discrete nodules or masses. The skin and  nipples appear normal. There is no cervical, supraclavicular, or axillary lymphadenopathy palpable. Heart rate and rhythm normal, S1 and S2 appreciated. The lungs are clear bilaterally. Abdomen is soft, non-tender.    Physical Exam  Chest:              IMAGING      Time was spent viewing digital images of the radiology testing with the patient. I explained the results in depth, along with suggested explanation for follow up recommendations based on the testing results.          ORDERS  No orders of the defined types were placed in this encounter.          ASSESSMENT/PLAN  No diagnosis found.     No follow-ups on file.      Olesya Varghese, [facility]

## 2023-12-29 ENCOUNTER — APPOINTMENT (OUTPATIENT)
Dept: SURGICAL ONCOLOGY | Facility: CLINIC | Age: 78
End: 2023-12-29
Payer: MEDICARE

## 2023-12-29 ENCOUNTER — APPOINTMENT (OUTPATIENT)
Dept: RADIOLOGY | Facility: CLINIC | Age: 78
End: 2023-12-29
Payer: MEDICARE

## 2023-12-29 NOTE — PROGRESS NOTES
TO do see that this lady    Juan Alberto Alcantara female   1945 78 y.o.   [id number]      Chief Complaint    Follow-up          HPI  Juan Alberto Alcantara is a 78 y.o. year old  female [facility]. 2016 right breast ultrasound core biopsy evidenced intraductal papilloma, sclerosing adenosis and apocrine metaplasia. This was not initially surgically excised. Followed imaging was performed 2017 & 2017 when it was decided she wanted to proceed with excision biopsy performed by Dr Denis Pimentel 2017 showing complex sclerosing lesion, usual ductal hyperplasia and apocrine metaplasia without atypia. 2018 right ultrasound core biopsy of subareolar mass noted sclerosing lesion, with intraductal papilloma, usual ductal hyperplasia & apocrine metaplasia. 2018 right magseed excision with Denis Pimentel noted intraductal papilloma, flat epithelial atypia and PASH. She has history of remote breast benign biopsy. She took Raloxifene for 12 years for bone loss. She has family history of breast cancer in her mother age 42 and 2 paternal aunts & paternal niece.     She wants to continue annual care in the [facility] with clinical breast exams.      BREAST IMAGIN2022 Screening mammogram, BI-RADS Category 2.     REPRODUCTIVE HISTORY: Menarche age 13, first birth age 30, menopause age 57, MOE/BSO, no HRT, >1 benign breast biopsies, scattered fibroglandular breast tissue     FAMILY CANCER HISTORY:  Mother: breast cancer age 42  Paternal aunt (1): breast cancer  Paternal aunt (2): breast cancer  Paternal niece: breast cancer  Maternal grandfather: colon cancer   Brother: lung cancer       REVIEW OF SYSTEMS    Constitutional:  Negative for appetite change, fatigue, fever and unexpected weight change.   HENT:  Negative for ear pain, hearing loss, nosebleeds, sore throat and trouble swallowing.    Eyes:  Negative for discharge, itching and visual  disturbance.   Respiratory:  Negative for cough, chest tightness and shortness of breath.    Cardiovascular:  Negative for chest pain, palpitations and leg swelling.   Breast: as indicated in HPI  Gastrointestinal:  Negative for abdominal pain, constipation, diarrhea and nausea.   Endocrine: Negative for cold intolerance and heat intolerance.   Genitourinary:  Negative for dysuria, frequency, hematuria, pelvic pain and vaginal bleeding.   Musculoskeletal:  Negative for arthralgias, back pain, gait problem, joint swelling and myalgias.   Skin:  Negative for color change and rash.   Allergic/Immunologic: Negative for environmental allergies and food allergies.   Neurological:  Negative for dizziness, tremors, speech difficulty, weakness, numbness and headaches.   Hematological:  Does not bruise/bleed easily.   Psychiatric/Behavioral:  Negative for agitation, dysphoric mood and sleep disturbance. The patient is not nervous/anxious.         MEDICATIONS  Current Outpatient Medications   Medication Instructions    Advair Diskus 250-50 mcg/dose diskus inhaler 1 puff, inhalation, 2 times daily RT    albuterol 90 mcg/actuation inhaler 2 puffs, inhalation, Every 4 hours PRN, As instructed    azelastine (Astelin) 137 mcg (0.1 %) nasal spray 2 sprays, Each Nostril, 2 times daily    denosumab (Prolia) 60 mg/mL syringe subcutaneous    fexofenadine (Allegra) 180 mg tablet 1 tablet, oral, Daily    FLUoxetine (PROZAC) 40 mg, oral, Daily    FLUoxetine (PROZAC) 40 mg, oral, Daily    LYCOPENE ORAL Centrum Silver    multivitamin with minerals iron-free (Centrum Silver) oral    nitrofurantoin, macrocrystal-monohydrate, (Macrobid) 100 mg capsule Take one tablet PRN (diarrhea) to prevent UTI    pantoprazole (ProtoNix) 40 mg EC tablet 1 tablet, oral, Daily, Take 1 hour before breakfast     propranolol (INDERAL) 20 mg, oral, 2 times daily    rosuvastatin (CRESTOR) 20 mg, oral, Daily    rosuvastatin (CRESTOR) 20 mg, oral, Daily         ALLERGIES  Allergies   Allergen Reactions    Adhesive Tape-Silicones Other and Rash     Skin becomes red and painful    Hydromorphone (Bulk) Other     Upset stomach  Vomiting    Penicillins Unknown and Rash     Ineffective and does not feel well    House Dust Unknown    Hydromorphone Other    Mold Unknown        SOCIAL HISTORY    Family History   Problem Relation Name Age of Onset    Breast cancer Mother      Other (Cardiac Disorder) Father      Colon cancer Maternal Grandfather      Breast cancer Other Paternal Aunt     Lung cancer Sibling      Thyroid cancer Sibling           Social History     Tobacco Use    Smoking status: Never    Smokeless tobacco: Never   Substance Use Topics    Alcohol use: Never        VITALS  Vitals:    01/23/24 1253   BP: 126/83   Pulse: 75        PHYSICAL EXAM  Patient is alert and oriented x3, with appropriate mood. Her gait is steady and hand grasps are equal. Sclera clear. The breasts are asymmetrical, left with more ptosis. Right breast has healed partial circumareolar incision from excisional biopsy. The tissue is soft without palpable abnormalities, discrete nodules or masses. The skin and nipples appear normal. There is no cervical, supraclavicular, or axillary lymphadenopathy palpable. Heart rate and rhythm normal, S1 and S2 appreciated. The lungs are clear bilaterally. Abdomen is soft, non-tender.     Physical Exam  Chest:              IMAGING    Bilateral screening mammogram, BI-RADS Category 2.     Time was spent viewing digital images of the radiology testing with the patient. I explained the results in depth, along with suggested explanation for follow up recommendations based on the testing results.          ORDERS  Orders Placed This Encounter   Procedures    BI mammo bilateral screening tomosynthesis     Standing Status:   Future     Standing Expiration Date:   3/22/2025     Order Specific Question:   Perform a breast ultrasound if clinically indicated by Radiologist?      Answer:   Yes     Order Specific Question:   Previous Mamm performed at  location?     Answer:   Yes     Order Specific Question:   Reason for exam:     Answer:   screening     Order Specific Question:   Radiologist to Determine Optimal Study     Answer:   Yes     Order Specific Question:   Release result to [employer]     Answer:   Immediate     Order Specific Question:   Is this exam part of a Research Study? If Yes, link this order to the research study     Answer:   No           ASSESSMENT/PLAN  1. Healthcare maintenance  BI mammo bilateral screening tomosynthesis      2. At high risk for breast cancer  Clinic Appointment Request           Follow up in about 1 year (around 1/23/2025), or with clinic screen.      Olesya Varghese, [facility] [place]

## 2024-01-04 ENCOUNTER — APPOINTMENT (OUTPATIENT)
Dept: RADIOLOGY | Facility: CLINIC | Age: 79
End: 2024-01-04
Payer: MEDICARE

## 2024-01-18 ENCOUNTER — OFFICE VISIT (OUTPATIENT)
Dept: PRIMARY CARE | Facility: CLINIC | Age: 79
End: 2024-01-18
Payer: MEDICARE

## 2024-01-18 VITALS
RESPIRATION RATE: 16 BRPM | SYSTOLIC BLOOD PRESSURE: 130 MMHG | HEIGHT: 62 IN | DIASTOLIC BLOOD PRESSURE: 80 MMHG | HEART RATE: 74 BPM | BODY MASS INDEX: 28.52 KG/M2 | TEMPERATURE: 98.3 F | WEIGHT: 155 LBS

## 2024-01-18 DIAGNOSIS — K21.00 GASTROESOPHAGEAL REFLUX DISEASE WITH ESOPHAGITIS, UNSPECIFIED WHETHER HEMORRHAGE: ICD-10-CM

## 2024-01-18 DIAGNOSIS — E78.49 OTHER HYPERLIPIDEMIA: Primary | ICD-10-CM

## 2024-01-18 DIAGNOSIS — Z00.00 HEALTH CARE MAINTENANCE: ICD-10-CM

## 2024-01-18 DIAGNOSIS — H90.A31 MIXED CONDUCTIVE AND SENSORINEURAL HEARING LOSS OF RIGHT EAR WITH RESTRICTED HEARING OF LEFT EAR: ICD-10-CM

## 2024-01-18 DIAGNOSIS — E78.49 OTHER HYPERLIPIDEMIA: ICD-10-CM

## 2024-01-18 DIAGNOSIS — J45.40 MODERATE PERSISTENT ASTHMA, UNSPECIFIED WHETHER COMPLICATED (HHS-HCC): ICD-10-CM

## 2024-01-18 DIAGNOSIS — F33.41 RECURRENT MAJOR DEPRESSIVE DISORDER, IN PARTIAL REMISSION (CMS-HCC): ICD-10-CM

## 2024-01-18 DIAGNOSIS — G25.0 BENIGN ESSENTIAL TREMOR: ICD-10-CM

## 2024-01-18 DIAGNOSIS — F41.9 ANXIETY: ICD-10-CM

## 2024-01-18 PROCEDURE — 99215 OFFICE O/P EST HI 40 MIN: CPT | Performed by: INTERNAL MEDICINE

## 2024-01-18 PROCEDURE — 1036F TOBACCO NON-USER: CPT | Performed by: INTERNAL MEDICINE

## 2024-01-18 PROCEDURE — 1126F AMNT PAIN NOTED NONE PRSNT: CPT | Performed by: INTERNAL MEDICINE

## 2024-01-18 PROCEDURE — 1159F MED LIST DOCD IN RCRD: CPT | Performed by: INTERNAL MEDICINE

## 2024-01-18 RX ORDER — FLUOXETINE HYDROCHLORIDE 40 MG/1
40 CAPSULE ORAL DAILY
Qty: 90 CAPSULE | Refills: 0 | Status: SHIPPED | OUTPATIENT
Start: 2024-01-18

## 2024-01-18 RX ORDER — ROSUVASTATIN CALCIUM 20 MG/1
20 TABLET, COATED ORAL DAILY
Qty: 90 TABLET | Refills: 3 | Status: SHIPPED | OUTPATIENT
Start: 2024-01-18

## 2024-01-18 RX ORDER — PROPRANOLOL HYDROCHLORIDE 20 MG/1
20 TABLET ORAL 2 TIMES DAILY
Qty: 60 TABLET | Refills: 5 | Status: SHIPPED | OUTPATIENT
Start: 2024-01-18 | End: 2024-07-16

## 2024-01-18 RX ORDER — FLUOXETINE HYDROCHLORIDE 40 MG/1
40 CAPSULE ORAL DAILY
Qty: 90 CAPSULE | Refills: 3 | Status: SHIPPED | OUTPATIENT
Start: 2024-01-18

## 2024-01-18 RX ORDER — ROSUVASTATIN CALCIUM 20 MG/1
20 TABLET, COATED ORAL DAILY
Qty: 90 TABLET | Refills: 0 | Status: SHIPPED | OUTPATIENT
Start: 2024-01-18 | End: 2024-01-23 | Stop reason: SDUPTHER

## 2024-01-18 ASSESSMENT — ENCOUNTER SYMPTOMS
DEPRESSION: 0
OCCASIONAL FEELINGS OF UNSTEADINESS: 0
LOSS OF SENSATION IN FEET: 0

## 2024-01-18 NOTE — PROGRESS NOTES
Juan Alberto Alcantara is a 78 y.o. female   NPV    Patient with a past medical history of osteoporosis on Prolia (DEXA 2024), Breast Ca followed by breast team, HLD, controlled anxiety/depression, recurrent UTI, Asthma, Mammogram due, Colonoscopy is up to date, stress incontinence, essential tremors, IBS (diarrhea)    Patient comes in for a routine check  Complains of chronic asthma which is fairly well-controlled with Advair  The asthma is mostly allergy induced    Also gets recurrent UTIs  Probably exacerbated by her IBS with diarrhea    Has been diagnosed with essential tremors that have been getting worse lately    Feels fine  No chest pain/  SOB/ dizziness  BM OK  Energy level ok  Appetite OK             Review of Systems     Constitutional: not feeling poorly, no fever, no recent weight gain and no recent weight loss.   Eyes: no blurred vision and no diplopia.   ENT: Hearing loss  Cardiovascular: no chest pain, no tightness or heavy pressure, no shortness of breath, no palpitations and no lower extremity edema.   Respiratory: Asthma  Gastrointestinal: no change in bowel habits, no constipation, no bloody stools, no nausea, no vomiting, no abdominal pain, no signs and symptoms of ulcer disease, no ari colored stools and no intolerance to fatty foods.   Genitourinary: Frequent UTIs  Musculoskeletal: no arthralgias, no joint stiffness, no muscle weakness, no back pain and no difficulty walking.   Skin: no rashes, no change in skin color and pigmentation, no skin lesions and no skin lumps.   Neurological: no headaches, no dizziness, no seizures, no tingling, no numbness, no signs and symptoms of stroke and no limb weakness.   Psychiatric: no confusion, no memory lapses or loss, no depression and no sleep disturbances.   Endocrine: no goiter, no thyroid disorder, no diabetes mellitus, no excessive thirst, no dry skin, no cold intolerance, no heat intolerance and no increased urinary frequency.   Hematologic/Lymphatic:  is not slow to heal, does not bleed easily, does not bruise easily, no thrombophlebitis, no anemia and no history of blood transfusion.   All other systems have been reviewed and are negative for complaint.     Vitals:    01/18/24 1456   BP: 130/80   Pulse: 74   Resp: 16   Temp: 36.8 °C (98.3 °F)        Physical Exam     Constitutional   General appearance: Alert and in no acute distress.   Eyes   Inspection of eyes: Sclera and conjunctiva were normal.    Pupil exam: Pupils were equal in size. Extraocular movements were intact.   Pulmonary   Respiratory assessment: No respiratory distress, normal respiratory rhythm and effort.    Auscultation of Lungs: Clear bilateral breath sounds.   Cardiovascular   Auscultation of heart: Apical pulse normal, heart rate and rhythm normal, normal S1 and S2, no murmurs and no pericardial rub.    Exam for edema: No peripheral edema.   Abdomen   Abdominal Exam: No bruits, normal bowel sounds, soft, non-tender, no abdominal mass palpated.    Liver and Spleen exam: No hepato-splenomegaly.   Musculoskeletal   Examination of gait: Normal.    Inspection of digits and nails: No clubbing or cyanosis of the fingernails.    Inspection/palpation of joints, bones and muscles: No joint swelling. Normal movement of all extremities.   Skin   Skin inspection: Normal skin color and pigmentation, normal skin turgor and no visible rash.   Neurologic   Cranial nerves: Nerves 2-12 were intact, no focal neuro defects.   Psychiatric   Orientation: Oriented to person, place, and time.    Mood and affect: Normal.       Assessment/Plan          Patient with a past medical history of osteoporosis on Prolia (DEXA 2024), Breast Ca followed by breast team, HLD, controlled anxiety/depression, recurrent UTI, Asthma, Mammogram due, Colonoscopy is up to date, stress incontinence, essential tremors, IBS (diarrhea)    #Essential tremors  Start low-dose propranolol  Patient counseled that since her asthma is mostly  allergy induced she should do okay on the propranolol  If however she finds that her asthma is getting worse on the beta-blocker  We will stop and try primidone    #Asthma  Allergy induced  Stable on Advair    #Dyslipidemia  Continue statins  Will check lipid panel next visit    #Osteoporosis  On Prolia  Scheduled for DEXA scan in the coming months    #Depression with anxiety  Stable continue home medications    #History of breast carcinoma  Scheduled for mammogram through her gynecologist    #Recurrent UTIs/stress incontinence  Follows up with urogynecology

## 2024-01-23 ENCOUNTER — HOSPITAL ENCOUNTER (OUTPATIENT)
Dept: RADIOLOGY | Facility: CLINIC | Age: 79
Discharge: HOME | End: 2024-01-23
Payer: MEDICARE

## 2024-01-23 ENCOUNTER — OFFICE VISIT (OUTPATIENT)
Dept: SURGICAL ONCOLOGY | Facility: CLINIC | Age: 79
End: 2024-01-23
Payer: MEDICARE

## 2024-01-23 VITALS
WEIGHT: 154.2 LBS | HEART RATE: 75 BPM | SYSTOLIC BLOOD PRESSURE: 126 MMHG | DIASTOLIC BLOOD PRESSURE: 83 MMHG | BODY MASS INDEX: 28.2 KG/M2

## 2024-01-23 VITALS — HEIGHT: 62 IN | BODY MASS INDEX: 28.52 KG/M2 | WEIGHT: 154.98 LBS

## 2024-01-23 DIAGNOSIS — Z91.89 AT HIGH RISK FOR BREAST CANCER: ICD-10-CM

## 2024-01-23 DIAGNOSIS — Z00.00 HEALTHCARE MAINTENANCE: Primary | ICD-10-CM

## 2024-01-23 DIAGNOSIS — Z12.39 ENCOUNTER FOR OTHER SCREENING FOR MALIGNANT NEOPLASM OF BREAST: ICD-10-CM

## 2024-01-23 PROCEDURE — 77063 BREAST TOMOSYNTHESIS BI: CPT

## 2024-01-23 PROCEDURE — 1036F TOBACCO NON-USER: CPT | Performed by: NURSE PRACTITIONER

## 2024-01-23 PROCEDURE — 99213 OFFICE O/P EST LOW 20 MIN: CPT | Performed by: NURSE PRACTITIONER

## 2024-01-23 PROCEDURE — 1159F MED LIST DOCD IN RCRD: CPT | Performed by: NURSE PRACTITIONER

## 2024-01-23 PROCEDURE — 1126F AMNT PAIN NOTED NONE PRSNT: CPT | Performed by: NURSE PRACTITIONER

## 2024-01-23 PROCEDURE — 77063 BREAST TOMOSYNTHESIS BI: CPT | Mod: BILATERAL PROCEDURE | Performed by: RADIOLOGY

## 2024-01-23 PROCEDURE — 77067 SCR MAMMO BI INCL CAD: CPT | Mod: BILATERAL PROCEDURE | Performed by: RADIOLOGY

## 2024-01-23 ASSESSMENT — PAIN SCALES - GENERAL: PAINLEVEL: 0-NO PAIN

## 2024-02-12 ENCOUNTER — OFFICE VISIT (OUTPATIENT)
Dept: GASTROENTEROLOGY | Facility: HOSPITAL | Age: 79
End: 2024-02-12
Payer: MEDICARE

## 2024-02-12 VITALS
DIASTOLIC BLOOD PRESSURE: 90 MMHG | WEIGHT: 155 LBS | OXYGEN SATURATION: 93 % | SYSTOLIC BLOOD PRESSURE: 133 MMHG | HEART RATE: 83 BPM | BODY MASS INDEX: 28.52 KG/M2 | HEIGHT: 62 IN | TEMPERATURE: 97.3 F

## 2024-02-12 DIAGNOSIS — D13.0 LEIOMYOMA OF ESOPHAGUS: ICD-10-CM

## 2024-02-12 DIAGNOSIS — K76.0 FATTY LIVER: ICD-10-CM

## 2024-02-12 DIAGNOSIS — K58.0 IRRITABLE BOWEL SYNDROME WITH DIARRHEA: Primary | ICD-10-CM

## 2024-02-12 DIAGNOSIS — M62.89 PELVIC FLOOR DYSFUNCTION IN FEMALE: ICD-10-CM

## 2024-02-12 DIAGNOSIS — R14.0 ABDOMINAL BLOATING: ICD-10-CM

## 2024-02-12 DIAGNOSIS — R12 HEARTBURN: ICD-10-CM

## 2024-02-12 PROCEDURE — 1036F TOBACCO NON-USER: CPT | Performed by: INTERNAL MEDICINE

## 2024-02-12 PROCEDURE — 99205 OFFICE O/P NEW HI 60 MIN: CPT | Performed by: INTERNAL MEDICINE

## 2024-02-12 PROCEDURE — 1160F RVW MEDS BY RX/DR IN RCRD: CPT | Performed by: INTERNAL MEDICINE

## 2024-02-12 PROCEDURE — 1159F MED LIST DOCD IN RCRD: CPT | Performed by: INTERNAL MEDICINE

## 2024-02-12 PROCEDURE — 1126F AMNT PAIN NOTED NONE PRSNT: CPT | Performed by: INTERNAL MEDICINE

## 2024-02-12 PROCEDURE — 99215 OFFICE O/P EST HI 40 MIN: CPT | Performed by: INTERNAL MEDICINE

## 2024-02-12 ASSESSMENT — PAIN SCALES - GENERAL: PAINLEVEL: 0-NO PAIN

## 2024-02-12 NOTE — PROGRESS NOTES
Subjective   Patient ID:  Ms. Alcantara is a 78 year old woman with BMI = 28, history of squamous cell cancer and basal cell cancer of skin, hyperlipidemia, prediabetes (last HbA1c = 5.7% 12/22/22), asthma/COPD (former smoker), anxiety, depression (on fluoxetine), metabolic dysfunction-associated steatotic liver disease (hepatic steatosis noted on RUQ ultrasound 5/23/14 at [facility]), diarrhea-predominant irritable bowel syndrome, benign esophageal leiomyoma (diagnosed 2014), recurrent UTIs (on prophylactic Macrobid PRN), stress urinary incontinence, small benign R renal parapelvic cyst, essential tremor (recently started on propranolol), osteoporosis, and DJD who is referred by Dr. Marko Bkaer for “Stomach Issues, IBS” (per the Central Scheduling comment).    The patient has been previously followed by Dr. Wallace Deutsch - [facility]. Unfortunately, there are only partial records from Dr. Deutsch in [facility] Everywhere.     The patient reports for the past 9 to 10 years, she has had ongoing issues with diarrhea alternating with somewhat normal bowel movements. However, for the past year or more, she has had more frequent issues with diarrhea, and then over the past 2 weeks since she was started on propranolol for essential tremor, she has had more constant diarrhea.    She reports that she has a normal bowel movement the first thing in the morning but then subsequent bowel movements are more loose and then turn into watery diarrhea. She states that for the past 2 weeks since she started propranolol, she has felt worn out and fatigued. She has been staying in her house the entire two weeks because she is worried about having to rush to the bathroom if she is out.     She has had fecal urgency. She has had only one episode of fecal incontinence related to not being able to get to bathroom in time. She denies any hematochezia or melena.    She has taken various antibiotics for UTIs over the  past few years. She has not been tested for C difficile.     She reports travel to [place] in April 2023 and [place] this past summer. She denies any other travel history. She states she had several days of diarrhea while in [place], and she did not leave the hotel lobby then.    She states she does not like taking medication. She was previously told to take Metamucil by Dr. Deutsch, but she did not follow through with that suggestion because she associates it with her brother having to take it while growing up after he was diagnosed with thyroid cancer at age 11. She states that thinking about Metamucil makes her want to throw up due to the association with her brother having to take it.    She also states she does not like taking medication in general, and she would only consider it if it would help her.  When she was given dicyclomine by Dr. Deutsch, she did note improvement in her symptoms, but when she felt better, she stopped taking it. She did not restart it after having recurrent symptoms because she does not like taking medication.  She vacillated over whether she would take medication to help versus not taking medication.      She has taken Imodium in the past which has helped. She states, however, that she would not have a BM for a day, and then the following day, she would have a hard stool after taking it. She has not taken Imodium recently.    She states that Dr. Deutsch told her to avoid peppermint. She states she only did so as an after dinner peppermint at restaurants.     She states she had severe difficulties with constipation when she was in her 20's through her 40's. She states she had to use laxatives frequently.    She has had gas and bloating for many years. She has lower abdominal distention when she gets bloated. She reports gas is worse at night.     She does not get much sleep. She states she gets between 4 to 6 hours per night.    She has periodic heartburn as well as an intermittent  burning sensation below her L breast. She states that Dr. Deutsch saw something on her EGD (unclear what; EGD report reviewed), and he told her at the time of her last EGD/EUS 22, to take pantoprazole as needed. She has relief when taking it.    Documented weights (in pounds; on various, different scales) include 144 22, 146 23, 155 24, 154 24, and 155 today.    She was somewhat tearful as she felt her diarrhea was causing the quality of life to be poor.    Previous evaluation at [facility] has included:  14 RUQ ultrasound: hepatic steatosis with focal fatty sparing left hepatic lobe; normal gallbladder, bile ducts, and pancreas  14 Colonoscopy: procedure report not in Epic Everywhere  14 EGD: procedure report not in Epic Everywhere  6/3/16 EGD with EUS: procedure reports not in Epic Everywhere  10/4/18 EGD with EUS: procedure reports not in Epic Everywhere  19 Colonscopy: procedure report not in Epic Everywhere  2022 Hydrogen breath test for SIBO: results not in Epic Everywhere  22 EGD with EUS with MAC: small hiatal hernia; widely patent Schatzki ring disrupted with biopsy forceps with biopsies (path = normal); 12.9 mm x 5.3 mm esophageal leiomyoma with focal calcification at 32 cm from incisors (no change in appearance or size compared to previous studies since ; Dr. Deutsch recommended no further surveillance, but the patient had requested a repeat EUS in 2 years); polypoid antral gastritis (biopsied; path = reactive gastropathy and PPI effect); otherwise, normal stomach; normal duodenum (biopsied; path = normal; no celiac disease).    Labs at  have included a normal CMP and CBC 11/3/23. A TSH 22 at [facility] was normal. Hepatitis C screening was done 16 at the [facility] but the results are not in Epic Everywhere.    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[place], [place]; moved to area here to be closer to daughter and her family  Lives in [place]  + tobacco x 16 years, quit   + Etoh wine  No recreational or IVDA    FH:  F  age 62 “shock” (unclear if cardiogenic); history of CAD with MI in his 40's  M  age 81 from metastatic breast cancer (diagnosed at age 44), macular degeneration  B  age 60 from heart condition (unclear); he had lung cancer (nonsmoker)  B alive age 81 with history of thyroid cancer diagnosed at age 11; CAD with MI at age 55; patient estranged from brother (otherwise, health history unknown)  Daughter alive age 57 with history of hysterectomy for endometriosis and history of IBS (patient's daughter followed by Dr. Deutsch and is doing well with unknown type of medication)  Grandchildren (adopted by daughter)  Maternal grandfather with history of colon cancer  Two paternal aunts and paternal niece with history of breast cancer  No FH of IBD, PUD, liver disease, or pancreatic disease, to her knowledge    ROS:  A 15-point ROS was conducted with the patient.  A copy of the ROS is scanned into [facility].    Objective   Physical Exam  Constitutional:       Appearance: She is obese.   HENT:      Mouth/Throat:      Mouth: Mucous membranes are moist.   Eyes:      Conjunctiva/sclera: Conjunctivae normal.   Cardiovascular:      Rate and Rhythm: Normal rate and regular rhythm.   Pulmonary:      Effort: Pulmonary effort is normal.      Breath sounds: Normal breath sounds.   Abdominal:      General: Bowel sounds are normal. There is no distension.      Palpations: Abdomen is soft. There is no mass.      Tenderness: There is no abdominal tenderness. There is no right  CVA tenderness, left CVA tenderness, guarding or rebound.      Hernia: No hernia is present.   Musculoskeletal:         General: Normal range of motion.   Skin:     General: Skin is warm.   Neurological:      Mental Status: She is oriented to person, place, and time.   Psychiatric:         Behavior: Behavior normal.      Comments: Occasionally tearful         Assessment/Plan     Diarrhea-predominant irritable bowel syndrome, long-standing, without red flag symptoms or signs with psychosocial difficulties including anxiety and depression. C difficile needs to be excluded for more frequent symptoms given antibiotic usage for recurrent UTIs. Extensive educational material was provided to the patient.  See additional information under patient discussion summary.  I discussed options for managing symptoms, including high fiber diet with fiber supplementation (Citrucel; not Metamucil as noted in HPI) starting at ½ to 1 tablespoon daily slowly titrating up based on response to treatment, hydrogen breath test to evaluate for small intestinal bacterial overgrowth (patient does not recall having this done with Dr. Deutsch although mentioned in his note from 6/2022), trial with low FODMAP diet for at least 6 to 8 weeks under the supervision of a nutritionist with gradual reintroduction of foods high in fermentable carbohydrates to determine any intolerance to specific fermentable carbohydrates, avoiding all caffeine, avoiding all alcohol, lactose-free diet, moderate to vigorous physical exercise 3 - 5 times a week, a two week trial with rifaximin 550 mg TID for SIBO (insurance may not cover), antispasmodics, potential addition of TCA, diaphragmatic breathing exercises, and cognitive behavior therapy and hypnotherapy given underlying anxiety and depression.  See extensive patient discussion summary. The patient has signed a release-of-information form to obtain records from her previous gastroenterologist. After discussion, the  patient prefers to not use any medications (as noted in HPI). She would like to try low FODMAP diet. She is willing to obtain C difficile stool testing. She would like to try Citrucel (not Metamucil as noted in HPI). She will return to see me in 2 months.    Recurrent UTIs. She was reassured that there is no evidence in the literature that diarrhea-predominant IBS increases the risk for recurrent UTIs.    Pelvic floor dysfunction with stress urinary incontinence and dysfunctional defecatory pattern with straining. I discussed options for further evaluation including anorectal manometry and MRI defecography with potential management including pelvic floor physical therapy. She should also see a urogynecologist for additional evaluation and treatment options. After discussion, the patient wishes to hold off on further evaluation.    Esophageal leiomyoma noted since 2014. Risk of developing malignancy is extremely low. She has had serial EGD and EUS procedures with her last one done 9/2022. Dr. Deutsch recommended no further surveillance given stability over the past 8 years, but the patient had requested a repeat EUS in 2 years (September 2024). The patient has signed a release-of-information form to obtain records from her previous gastroenterologist.    Metabolic dysfunction-associated steatotic liver disease in setting of BMI = 28, hyperlipidemia, and prediabetes. The patient should follow up with her PCP for a repeat HbA1c and weight loss programs. She may need additional evaluation including repeat liver ultrasound and Fibroscan; will discuss at next visit.    Hepatitis C screening.  Hepatitis C screening was done 4/20/16 at the [facility] but the results are not in [facility] Everywhere. The patient has signed a release-of-information form to obtain records from her previous gastroenterologist.    Colorectal cancer screening. The patient had a screening colonoscopy done 2019 which was normal. Further screening  is not indicated given advanced age.    Medical decision-making and time spent in both non-face-to-face time and face-to-face included time spent preparing to see patient, obtaining and/or reviewing separately obtained history, review and/or ordering of labs, radiology studies (including personal review of imaging), medical tests, and/or prior medical records (including summary of records), independently interpreting results and/or communicating results to patient, review and/or ordering of endoscopic procedures with risk factors, performing a medically necessary appropriate physical examination, counseling and educating the patient, communicating with other providers, care coordination, and documenting clinical information as noted above.    All the patient's questions were answered to her full satisfaction.     Adriano Rollins MD, TANVI  Chief Medical   [facility]  Associate Chief and Director of Clinical Operations  Division of Gastroenterology and Liver Disease  [facility] Master Clinician  Professor of Medicine  [facility]    cc: Marko Baker MD    Time Spent  Prep time on day of patient encounter: 145 minutes  Time spent directly with patient, family or caregiver: 65 minutes  Additional Time Spent on Patient Care Activities: 15 minutes  Documentation Time: 45 minutes  Other Time Spent: 10 minutes  Total: 280 minutes        Adriano Rollins MD 02/12/24 2:16 PM

## 2024-02-12 NOTE — PATIENT INSTRUCTIONS
Thank you for seeing me! You have a condition called irritable bowel syndrome which is very common, affecting 1 in 10 people in the [place].  There is no single cure or treatment.  However, there are ways to manage symptoms of irritable bowel syndrome to make you feel better.  Fortunately, it is a benign disease and does not lead to any to any long-term organic complications.  I have provided you educational brochures on this condition, and a good website to also use is www.aboutIBS.org.     Irritable bowel syndrome is a result of alterations in the nerve pathways between the brain and gut that can cause multiple gastrointestinal symptoms. It is classified as a disorder of gut-brain interactions (DGBI). The nerves of the brain and the gastrointestinal system are physically connected and hardwired, more than any other organ system, in something called the brain-gut axis. These alterations can result in changes in the motility of the GI tract (from diarrhea to constipation to alternating between both), visceral hypersensitivity (more intense abdominal pain than usual in response to stimuli), altered mucosal and immune function (changes to the bowel's mucus membrane and immune response, often called “leaky gut”), altered intestinal microbiome (changes to the normal microbes living in a healthy gut causing an imbalance of “good” and “bad” bacteria), and altered central nervous system processing (changes in how the brain processes pain and other GI symptoms and/or in the brain's failure via the stress mediated pain modulatory center to “turn down” the pain signals coming from the gut).    Usually pain from the gut goes to the brain and the brain sends down nerve signals to block the pain, much like if you were running in a race and sprained your ankle; you may not feel it as much during the race because with attention to the race, the brain can block those pain signals. With your symptoms, you have more intense  nerve signals coming from your GI tract (visceral hypersensitivity) or spinal cord (central hypersensitivity), and possibly also a problem in the way the brain controls those nerve signals (disinhibition). Over time the pain you feel may be a combination of increased nerve firing in the GI tract and reduced ability of the brain to block the pain signals. Furthermore, these chronic and painful gastrointestinal symptoms may trigger symptoms of anxiety or depression, which in turn, lowers pain thresholds and makes your symptoms worse. We need to understand and treat both your physical symptoms and the associated emotional distress related to it.     Getting adequate sleep and reducing stress are important in controlling symptoms. Avoiding all caffeine and all alcohol is important. Moderate to vigorous physical exercise (20 to 60 minutes) 3 to 5 times a week is also important.       All medications can have multiple purposes. For example, aspirin can be used as an analgesic, or to prevent a heart attack. In this manner, antidepressants, although originally developed for depression, are also used as central analgesics for a variety of painful conditions, including peripheral neuropathy, migraine headaches, and visceral pain. Also, medications designed to have central actions also generate GI effects, especially as the brain and the bowel use the same neurotransmitter signals and receptors. Many of these medications reduce nerve impulses arising from the gut and going to the brain or by facilitating inhibitory pathways from the brain that block the incoming pain signals. These medications usually work sooner for pain, and often in lower dosages than when treating psychiatric disorders like major depression, and they also work when there is no evidence for depression and anxiety. However, they can also treat the understandable emotional distress people get from the illness even when they do not have a psychiatric  diagnosis. Also, these medications, unlike opioids, are not habit forming and they do not alter your thinking. Any benefit may take 3 to 4 weeks (although may occur sooner), and side-effects, if they occur, tend to diminish in 1 to 2 weeks. Treatment is continued for 6 to 12 months before tapering, and dosage adjustments are usually mutually determined based on degree of benefit or side effects. Continued treatment may influence central neurogenesis leading to “rewiring,” with improved brain - gut functioning and restoration of a better clinical state over time.      Bloating can be related to eating foods that are fermented in the intestine and produce a lot of gas. Many foods, like beans and other legumes, as well as cruciferous vegetables like broccoli, can cause gas. Some of these foods contain short-chain carbohydrates, and identifying certain FODMAPs (fermentable oligosaccharides, disaccharides, monosaccharides, and polyols) to avoid in your case may be important to know. Other common offenders are liquids that contain fructose or high-fructose corn syrup, including various juices, sports drinks, energy drinks, and sodas.    The list of foods that relieve bloating is very short. Green kiwifruit is one of the very few foods that research has linked to symptom improvement. Some patients with bloating and constipation have a response by eating 2 green kiwifruits per day.  Peppermint oil can sometimes help.  Magnesium sometimes helps.    Bloating is also a result of discoordination of your abdominal muscles and your diaphragm.  Abdominal breathing exercises often result in improvement of bloating.  I have given you a handout on bloating and the exercises to try.  A good video demonstrating this technique (from the [facility]) can be found at [email or web address]-breathing-[email or web address].    I also recommend cognitive behavior therapy  (CBT) and hypnotherapy. CBT is not a cure for brain-gut disorders, but the tools and skills developed during therapy can dramatically reduce the stress of coping with a chronic condition. The gut and the brain are interconnected. Some people have a sensitive gut that amplifies the symptom response. We are able to calm this response. As stress decreases, symptoms often improve because of the reduced physical activation that can make symptoms worse. The brain can learn to downregulate pain, and CBT helps to shift these troublesome GI symptoms to the background by reducing pain and improving bowel habits, so that people can experience decreased depression, reduced anxiety, and improved quality of life.  CBT is associated with 70% of patients achieving adequate symptom relief and improved quality of life.    Hypnotherapy encourages a state of focused attention and deep relaxation. Images and verbal suggestions have a positive influence on gut symptoms. Hypnotherapy can treat IBS and can have a quieting effect on gut sensations and help normalize motility. Studies have shown that 75% of patients with severe, refractory IBS achieve at least 50% symptom reduction after a course of hypnotherapy.     A GI psychologist can help with this collaborative approach and provide CBT and hypnotherapy. CBT is typically 4-8 sessions. Hypnotherapy treatment is typically 7 sessions.     For diarrhea-predominant irritable bowel syndrome, there are many people who benefit from being on a low FODMAP diet as these types of sugars may result in an altered intestinal microbiome (mentioned above). I have provided educational brochures on this diet, and a good website to also use is http://www.Lineagen.com/diet.html from the [facility]. You need to coordinate with a nutritionist for this diet regimen as you will need hands-on information as well as a gradual supervised re-introduction of these specific sugars to determine which  of these sugars are contributing to symptoms. This is complicated and easy to mess up, and cutting all these foods can lead to nutrient problems. Supervision by a nutritionist for this diet is essential, and you cannot do this alone.     If you are interested in reading more on the interaction of the mind and gut, I encourage you to read the book by Dr. Samantha Talbot entitled The Mind-Gut Connection. Another good website to use is: https://theromefoundation.org/irritable-bowel-syndrome/    There is no evidence in the literature that IBS increases the risk for recurrent UTIs.    We discussed the possibility of pelvic floor dysfunction in light of your stress urinary incontinence and dysfunctional defecatory pattern with straining. I discussed options for further evaluation including anorectal manometry and MRI defecography with potential management including pelvic floor physical therapy.     You have had an esophageal leiomyoma noted since 2014. It is considered a benign lesion, and the risk of developing malignancy is extremely low. You have had serial EGD and EUS procedures with your last one done 9/2022. Dr. Deutsch recommended no further surveillance given the stability over the past 8 years, but you had requested a repeat EUS in 2 years (September 2024) when you had it done. You have signed a release-of-information form to obtain records from your previous gastroenterologist.    After discussion with you, and based on your preferences, you have elected to proceed with referral to nutrition for the low FODMAP diet for which I have provided information. You need to see the nutritionist to assist with this diet; it should not be done on your own. You have elected to proceed with Citrucel fiber supplementation which you should take once a day. You should have a stool test for C difficile to make sure you did not get this infection as a result of your prior antibiotic use for your urine infections.  The stool test  needs to be done fresh.    You prefer not to proceed with other options right now. Please follow up with me in 6 to 8 weeks.

## 2024-02-12 NOTE — LETTER
February 12, 2024     Marko Baker MD  [address] [place] [address]   [address] [address]    Patient: Juan Alberto Alcantara   YOB: 1945   Date of Visit: 2/12/2024       Dear Dr. Marko Baker MD:    Thank you for referring Juan Alberto Alcantara to me for evaluation. Below are my notes for this consultation.  If you have questions, please do not hesitate to call me. I look forward to following your patient along with you.       Sincerely,     Adriano Rollins MD      CC: No Recipients  ______________________________________________________________________________________    Subjective   Patient ID:  Ms. Alcantara is a 78 year old woman with BMI = 28, history of squamous cell cancer and basal cell cancer of skin, hyperlipidemia, prediabetes (last HbA1c = 5.7% 12/22/22), asthma/COPD (former smoker), anxiety, depression (on fluoxetine), metabolic dysfunction-associated steatotic liver disease (hepatic steatosis noted on RUQ ultrasound 5/23/14 at [facility]), diarrhea-predominant irritable bowel syndrome, benign esophageal leiomyoma (diagnosed 2014), recurrent UTIs (on prophylactic Macrobid PRN), stress urinary incontinence, small benign R renal parapelvic cyst, essential tremor (recently started on propranolol), osteoporosis, and DJD who is referred by Dr. Marko Baker for “Stomach Issues, IBS” (per the Central Scheduling comment).    The patient has been previously followed by Dr. Wallace Deutsch - [facility]. Unfortunately, there are only partial records from Dr. Deutsch in [facility] Everywhere.     The patient reports for the past 9 to 10 years, she has had ongoing issues with diarrhea alternating with somewhat normal bowel movements. However, for the past year or more, she has had more frequent issues with diarrhea, and then over the past 2 weeks since she was started on propranolol for essential tremor, she has had more constant diarrhea.    She reports that she has a normal bowel movement the  first thing in the morning but then subsequent bowel movements are more loose and then turn into watery diarrhea. She states that for the past 2 weeks since she started propranolol, she has felt worn out and fatigued. She has been staying in her house the entire two weeks because she is worried about having to rush to the bathroom if she is out.     She has had fecal urgency. She has had only one episode of fecal incontinence related to not being able to get to bathroom in time. She denies any hematochezia or melena.    She has taken various antibiotics for UTIs over the past few years. She has not been tested for C difficile.     She reports travel to [place] in April 2023 and [place] this past summer. She denies any other travel history. She states she had several days of diarrhea while in [place], and she did not leave the hotel lobby then.    She states she does not like taking medication. She was previously told to take Metamucil by Dr. Deutsch, but she did not follow through with that suggestion because she associates it with her brother having to take it while growing up after he was diagnosed with thyroid cancer at age 11. She states that thinking about Metamucil makes her want to throw up due to the association with her brother having to take it.    She also states she does not like taking medication in general, and she would only consider it if it would help her.  When she was given dicyclomine by Dr. Deutsch, she did note improvement in her symptoms, but when she felt better, she stopped taking it. She did not restart it after having recurrent symptoms because she does not like taking medication.  She vacillated over whether she would take medication to help versus not taking medication.      She has taken Imodium in the past which has helped. She states, however, that she would not have a BM for a day, and then the following day, she would have a hard stool after taking it. She has not taken Imodium  recently.    She states that Dr. Deutsch told her to avoid peppermint. She states she only did so as an after dinner peppermint at restaurants.     She states she had severe difficulties with constipation when she was in her 20's through her 40's. She states she had to use laxatives frequently.    She has had gas and bloating for many years. She has lower abdominal distention when she gets bloated. She reports gas is worse at night.     She does not get much sleep. She states she gets between 4 to 6 hours per night.    She has periodic heartburn as well as an intermittent burning sensation below her L breast. She states that Dr. Deutsch saw something on her EGD (unclear what; EGD report reviewed), and he told her at the time of her last EGD/EUS 9/21/22, to take pantoprazole as needed. She has relief when taking it.    Documented weights (in pounds; on various, different scales) include 144 12/22/22, 146 1/19/23, 155 1/18/24, 154 1/23/24, and 155 today.    She was somewhat tearful as she felt her diarrhea was causing the quality of life to be poor.    Previous evaluation at [facility] has included:  5/23/14 RUQ ultrasound: hepatic steatosis with focal fatty sparing left hepatic lobe; normal gallbladder, bile ducts, and pancreas  6/7/14 Colonoscopy: procedure report not in Epic Everywhere  11/25/14 EGD: procedure report not in Epic Everywhere  6/3/16 EGD with EUS: procedure reports not in Epic Everywhere  10/4/18 EGD with EUS: procedure reports not in Epic Everywhere  8/14/19 Colonscopy: procedure report not in Epic Everywhere  6/2022 Hydrogen breath test for SIBO: results not in Epic Everywhere  9/21/22 EGD with EUS with MAC: small hiatal hernia; widely patent Schatzki ring disrupted with biopsy forceps with biopsies (path = normal); 12.9 mm x 5.3 mm esophageal leiomyoma with focal calcification at 32 cm from incisors (no change in appearance or size compared to previous studies since 2014; Dr. Deutsch  recommended no further surveillance, but the patient had requested a repeat EUS in 2 years); polypoid antral gastritis (biopsied; path = reactive gastropathy and PPI effect); otherwise, normal stomach; normal duodenum (biopsied; path = normal; no celiac disease).    Labs at  have included a normal CMP and CBC 11/3/23. A TSH 22 at [facility] was normal. Hepatitis C screening was done 16 at the [facility] but the results are not in Epic Everywhere.    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[place], [place]; moved to area here to be closer to daughter and her family  Lives in [place]  + tobacco x 16 years, quit   + Etoh wine  No recreational or IVDA    FH:  F  age 62 “shock” (unclear if cardiogenic); history of CAD with MI in his 40's  M  age 79 from metastatic breast cancer (diagnosed at age 44), macular degeneration  B  age 60 from heart condition (unclear); he had lung cancer (nonsmoker)  B alive age 81 with history of thyroid cancer diagnosed at age 11; CAD with MI at age 55; patient estranged from brother (otherwise, health history unknown)  Daughter alive age 57 with history of hysterectomy for endometriosis and history of IBS (patient's daughter followed by Dr. Deutsch and is doing well with unknown type of medication)  Grandchildren (adopted by daughter)  Maternal grandfather with history of colon cancer  Two paternal aunts and paternal niece with history of breast cancer  No FH of IBD, PUD, liver disease, or pancreatic disease, to her knowledge    ROS:  A 15-point ROS was conducted with the patient.  A copy of the ROS is  scanned into [facility].    Objective   Physical Exam  Constitutional:       Appearance: She is obese.   HENT:      Mouth/Throat:      Mouth: Mucous membranes are moist.   Eyes:      Conjunctiva/sclera: Conjunctivae normal.   Cardiovascular:      Rate and Rhythm: Normal rate and regular rhythm.   Pulmonary:      Effort: Pulmonary effort is normal.      Breath sounds: Normal breath sounds.   Abdominal:      General: Bowel sounds are normal. There is no distension.      Palpations: Abdomen is soft. There is no mass.      Tenderness: There is no abdominal tenderness. There is no right CVA tenderness, left CVA tenderness, guarding or rebound.      Hernia: No hernia is present.   Musculoskeletal:         General: Normal range of motion.   Skin:     General: Skin is warm.   Neurological:      Mental Status: She is oriented to person, place, and time.   Psychiatric:         Behavior: Behavior normal.      Comments: Occasionally tearful         Assessment/Plan     Diarrhea-predominant irritable bowel syndrome, long-standing, without red flag symptoms or signs with psychosocial difficulties including anxiety and depression. C difficile needs to be excluded for more frequent symptoms given antibiotic usage for recurrent UTIs. Extensive educational material was provided to the patient.  See additional information under patient discussion summary.  I discussed options for managing symptoms, including high fiber diet with fiber supplementation (Citrucel; not Metamucil as noted in HPI) starting at ½ to 1 tablespoon daily slowly titrating up based on response to treatment, hydrogen breath test to evaluate for small intestinal bacterial overgrowth (patient does not recall having this done with Dr. Deutsch although mentioned in his note from 6/2022), trial with low FODMAP diet for at least 6 to 8 weeks under the supervision of a nutritionist with gradual reintroduction of foods high in fermentable carbohydrates to determine any  intolerance to specific fermentable carbohydrates, avoiding all caffeine, avoiding all alcohol, lactose-free diet, moderate to vigorous physical exercise 3 - 5 times a week, a two week trial with rifaximin 550 mg TID for SIBO (insurance may not cover), antispasmodics, potential addition of TCA, diaphragmatic breathing exercises, and cognitive behavior therapy and hypnotherapy given underlying anxiety and depression.  See extensive patient discussion summary. The patient has signed a release-of-information form to obtain records from her previous gastroenterologist. After discussion, the patient prefers to not use any medications (as noted in HPI). She would like to try low FODMAP diet. She is willing to obtain C difficile stool testing. She would like to try Citrucel (not Metamucil as noted in HPI). She will return to see me in 2 months.    Recurrent UTIs. She was reassured that there is no evidence in the literature that diarrhea-predominant IBS increases the risk for recurrent UTIs.    Pelvic floor dysfunction with stress urinary incontinence and dysfunctional defecatory pattern with straining. I discussed options for further evaluation including anorectal manometry and MRI defecography with potential management including pelvic floor physical therapy. She should also see a urogynecologist for additional evaluation and treatment options. After discussion, the patient wishes to hold off on further evaluation.    Esophageal leiomyoma noted since 2014. Risk of developing malignancy is extremely low. She has had serial EGD and EUS procedures with her last one done 9/2022. Dr. Deutsch recommended no further surveillance given stability over the past 8 years, but the patient had requested a repeat EUS in 2 years (September 2024). The patient has signed a release-of-information form to obtain records from her previous gastroenterologist.    Metabolic dysfunction-associated steatotic liver disease in setting of BMI = 28,  hyperlipidemia, and prediabetes. The patient should follow up with her PCP for a repeat HbA1c and weight loss programs. She may need additional evaluation including repeat liver ultrasound and Fibroscan; will discuss at next visit.    Hepatitis C screening.  Hepatitis C screening was done 4/20/16 at the [facility] but the results are not in Epic Everywhere. The patient has signed a release-of-information form to obtain records from her previous gastroenterologist.    Colorectal cancer screening. The patient had a screening colonoscopy done 2019 which was normal. Further screening is not indicated given advanced age.    Medical decision-making and time spent in both non-face-to-face time and face-to-face included time spent preparing to see patient, obtaining and/or reviewing separately obtained history, review and/or ordering of labs, radiology studies (including personal review of imaging), medical tests, and/or prior medical records (including summary of records), independently interpreting results and/or communicating results to patient, review and/or ordering of endoscopic procedures with risk factors, performing a medically necessary appropriate physical examination, counseling and educating the patient, communicating with other providers, care coordination, and documenting clinical information as noted above.    All the patient's questions were answered to her full satisfaction.     Adriano Rollins MD, TANVI  Chief Medical   [facility] [place]  Associate Chief and Director of Clinical Operations  Division of Gastroenterology and Liver Disease  [facility] Master Clinician  Professor of Medicine  [facility]    cc: Marko Baker MD    Time Spent  Prep time on day of patient encounter: 145 minutes  Time spent directly with patient, family or caregiver: 65 minutes  Additional Time Spent on Patient Care  Activities: 15 minutes  Documentation Time: 45 minutes  Other Time Spent: 10 minutes  Total: 280 minutes        Adriano Rollins MD 02/12/24 2:16 PM

## 2024-02-19 NOTE — PROGRESS NOTES
Nutrition: Initial Assessment    Reason for Nutrition Visit: Patient is a 78 y.o. female referred for IBS-D, fatty liver, and abdominal bloating. Referred on 2/12/24 by Dr. Rollins. Per chart review, GI provider would like patient to try low FODMAP diet.     Nutrition Assessment    Food and Nutrient History: Pt presents for nutrition counseling. Has significant IBS, predominantly diarrhea. Ongoing for years. Has abdominal pain, excessive gas (in the evening), bloating. Most of her life, she struggled with constipation. In more recent years, diarrhea is predominate symptom. Has loose, watery BM 4-5x per day. Sometimes needs to go within 10-15 minutes after eating and other times it seems to be more erratic. Does not eat meat. Does eat poultry and fish. Followed a vegetarian diet briefly and developed anemia. Going to bed at 1-3am. Feels very hungry in the later evening hours. Lives alone and enjoys eating out. Has been taking Imodium as needed.     Dietary Recall:  Meal 1: 3-4 cups coffee with flavored coffee mate (fat-free)  Meal 2: fried chicken tenders // turkey sandwich with tomato and onion // toasted english muffin with turkey pepperoni and cheese  Meal 3: lean cuisine // cod or chicken with baked potatoes + veggies  // chicken fajitas // Clarke [employer] mashed potatoes  Snacks: chicken tenders, a couple starbusts, tostitos with cheese, crackers, Italian Ice  Fluid Intake: water (very limited), flavored carbonated water, coffee, occasional diet beverages (has significantly reduced)    Food Allergy: none  Food Intolerance: sugar alcohols, some degree of lactose intolerance (especially with ice cream)  GI Symptoms: bloating, nausea, diarrhea, constipation, increased gas, abdominal pain GI Symptoms greater than 2 weeks: intermittent  Oral Problems: denies  Dentition: own  Cooking: Patient  Grocery Shopping: Patient  Alcohol: red wine 1-2 glasses daily  Dietary Supplements: Centrum Silver (tries to take it with  crackers in the morning),   Food Insecurity: Denies    Physical Activity: mostly sedentary    Labs:  Lab Results   Component Value Date    HGBA1C 5.7 (A) 12/22/2022    HGBA1C 5.6 07/06/2022    HGBA1C 5.6 04/04/2022     11/02/2023    K 4.4 11/02/2023     11/02/2023    CO2 25 11/02/2023    BUN 17 11/02/2023    CREATININE 0.79 11/02/2023    CALCIUM 9.5 11/02/2023    ALBUMIN 4.4 11/02/2023    PROT 6.8 11/02/2023    BILITOT 0.4 11/02/2023    ALKPHOS 62 11/02/2023    ALT 22 11/02/2023    AST 19 11/02/2023    GLUCOSE 94 11/02/2023    CHOL 146 12/22/2022    TRIG 96 12/22/2022    HDL 62.1 12/22/2022   Comments: Low vitamin D = 28 (11/2/23). CMP: eGFR = 78 (11/2/23). Last lipid panel = normal (12/22/22). A1c = 5.7% suggestive of pre-DM (12/22/22).       Nutrition Focused Physical Exam:    Performed/Deferred: Performed    Muscle Wasting:  Temporalis: Well nourished (well-defined muscle)  Pectoralis (Clavicular Region): Well nourished (clavicle not visible)  Deltoid/Trapezius: Well nourished (rounded appearance at arm, shoulder, neck)  Interosseous: Well nourished (muscle bulges)  Quadriceps: Well nourished (well developed, well rounded)    Loss of Subcutaneous Fat:  Orbital Fat Pads: Well nourshed (slightly bulging fat pads)  Buccal Fat Pads: Well nourished (full, rounded cheeks)  Triceps: Well nourished (ample fat tissue)  Ribs: Well nourished (full chest, ribs do not protrude)    Other Physical Findings:  Hair: Negative  Eyes: Negative  Mouth: Negative  Skin: Negative  Nails: Negative    Past Medical History:  Patient Active Problem List   Diagnosis    Anxiety    Asthma    Depression    Epiretinal membrane (ERM) of both eyes    Fatty liver    Gastroesophageal reflux disease    Hearing loss    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    Hyperlipidemia    Keratoconjunctivitis sicca of both eyes not specified as Sjogren's    Major depressive disorder, single episode, unspecified    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    Nasal vestibulitis    Nuclear senile cataract of both eyes    Obesity, Class I, BMI 30-34.9    Otosclerosis involving oval window, nonobliterative    Posterior vitreous detachment of both eyes    Presbyopia    Recurrent major depressive disorder, in partial remission (CMS/HCC)    Recurrent UTI    Senile osteoporosis    Skin cancer    Prediabetes    Osteoporosis    Overweight with body mass index (BMI) 25.0-29.9    Breast cancer screening, high risk patient    Breast cancer screening, high risk patient        Anthropometrics:  Ht Readings from Last 1 Encounters:   02/20/24 1.575 m (5' 2\")     BMI Readings from Last 1 Encounters:   02/20/24 28.35 kg/m²     Wt Readings from Last 10 Encounters:   02/12/24 70.3 kg (155 lb)   01/23/24 70.3 kg (154 lb 15.7 oz)   01/23/24 69.9 kg (154 lb 3.2 oz)   01/18/24 70.3 kg (155 lb)   12/14/23 69.9 kg (154 lb)   01/19/23 66.2 kg (146 lb)   12/22/22 65.3 kg (144 lb)   12/22/22 65.3 kg (144 lb)   06/09/22 71.7 kg (158 lb)   03/09/22 70.8 kg (156 lb)       Estimated Energy Needs:    Total Energy Estimated Needs (kCal): 1400 kCal   Method for Estimating Needs: MSJ 1146 x 1.2   Total Protein Estimated Needs (g): 56 g   Total Protein Estimated Needs (g/kg): 0.8 g/kg    Nutrition Diagnosis     Patient has Malnutrition Diagnosis: No          Patient has Nutrition Diagnosis: Yes Diagnosis Status (1): New  Nutrition Diagnosis 1: Food and nutrition related knowledge deficit Related to (1): no prior education on low FODMAP diet As Evidenced by (1): patient's desire to learn     Diagnosis Status (2): New Nutrition Diagnosis 2: Altered GI function  Nutrition Diagnosis 2: Altered GI function Related to (2): IBS As Evidenced by (2): patient reports diarrhea, bloating, excessive gas, abdominal pain                    Nutrition Interventions/Recommendations     Nutrition Education:   Provided education on " low-FODMAP diet for management of IBS-related GI symptoms. Talked about the following:   FODMAP stands for Fermentable, Oligo-, Di-, and Monosaccharides And Polyols. Foods that are rich in FODMAPs are carbohydrate-based foods that can be difficult for some people to digest. Some of these foods can ferment in the GI tract. The poor digestion and/or fermentation of these foods can lead to symptoms like gas, abdominal pain, diarrhea, constipation, and other digestive symptoms.   Discussed that a temporary elimination diet can be helpful to learn which of the 6 categories are contributing to symptoms. Advised that this diet is not intended to be long-term and should be used under the supervision of a dietitian.   Instructed patient on the elimination phase of the diet, which lasts 2-3 weeks. Discussed that the low-FODMAP food list will be used to create a meal plan during the elimination period. Provided sample meal ideas and a list of items to keep stocked. Also suggested recipes that could be helpful. Discussed that GI symptoms should be monitored daily for the duration of the elimination phase. If symptoms improve on a low-FODMAP diet, then the re-introduction phase should follow.   Discussed the re-introduction phase, which involves spending 6 weeks introducing high FODMAP food categories one at a time and monitoring symptoms closely. Recommended following up with a dietitian during this phase to discuss symptoms and for support with meal planning.     *Patient expressed understanding of the nutrition education and denied any additional questions/concerns.     Educational Handouts: FODMAP Elimination Diet Overview by Anais Vang    Nutrition Monitoring and Evaluation   Estimated PO intake > 75% estimated energy needs  Improvement in patient's self-reported GI symptoms       Readiness to Change : Excellent  Level of Understanding : Excellent  Anticipated Compliant : Excellent     Follow-up:  3 weeks

## 2024-02-20 ENCOUNTER — NUTRITION (OUTPATIENT)
Dept: PRIMARY CARE | Facility: CLINIC | Age: 79
End: 2024-02-20
Payer: MEDICARE

## 2024-02-20 VITALS — BODY MASS INDEX: 28.35 KG/M2 | HEIGHT: 62 IN

## 2024-02-20 DIAGNOSIS — R14.0 ABDOMINAL BLOATING: ICD-10-CM

## 2024-02-20 DIAGNOSIS — R19.7 DIARRHEA, UNSPECIFIED TYPE: Primary | ICD-10-CM

## 2024-02-20 DIAGNOSIS — K58.0 IRRITABLE BOWEL SYNDROME WITH DIARRHEA: ICD-10-CM

## 2024-02-20 DIAGNOSIS — Z71.3 DIETARY COUNSELING AND SURVEILLANCE: Primary | ICD-10-CM

## 2024-02-20 DIAGNOSIS — K76.0 FATTY LIVER: ICD-10-CM

## 2024-02-20 PROCEDURE — 97802 MEDICAL NUTRITION INDIV IN: CPT

## 2024-02-28 ENCOUNTER — APPOINTMENT (OUTPATIENT)
Dept: AUDIOLOGY | Facility: CLINIC | Age: 79
End: 2024-02-28
Payer: MEDICARE

## 2024-02-28 ENCOUNTER — APPOINTMENT (OUTPATIENT)
Dept: OTOLARYNGOLOGY | Facility: CLINIC | Age: 79
End: 2024-02-28
Payer: MEDICARE

## 2024-03-05 ENCOUNTER — APPOINTMENT (OUTPATIENT)
Dept: AUDIOLOGY | Facility: CLINIC | Age: 79
End: 2024-03-05
Payer: MEDICARE

## 2024-03-05 ENCOUNTER — APPOINTMENT (OUTPATIENT)
Dept: OTOLARYNGOLOGY | Facility: CLINIC | Age: 79
End: 2024-03-05
Payer: MEDICARE

## 2024-03-05 NOTE — PROGRESS NOTES
Nutrition: Follow-up     Reason for Nutrition Visit: Patient is a 78 y.o. female referred for IBS-D, fatty liver, and abdominal bloating. Referred on 2/12/24 by Dr. Rollins. Per chart review, GI provider would like patient to try low FODMAP diet.     Nutrition Assessment    Food and Nutrient History: Has noticed significantly less diarrhea (has noticed 60-70% improvement), abdominal pain (has noticed 60% improvement), gassiness (has noticed 30-40% improvement). Still feels bloating.     Visit #1  Pt presents for nutrition counseling. Has significant IBS, predominantly diarrhea. Ongoing for years. Has abdominal pain, excessive gas (in the evening), bloating. Most of her life, she struggled with constipation. In more recent years, diarrhea is predominate symptom. Has loose, watery BM 4-5x per day. Sometimes needs to go within 10-15 minutes after eating and other times it seems to be more erratic. Does not eat meat. Does eat poultry and fish. Followed a vegetarian diet briefly and developed anemia. Going to bed at 1-3am. Feels very hungry in the later evening hours. Lives alone and enjoys eating out. Has been taking Imodium as needed.       Food Allergy: none  Food Intolerance: sugar alcohols, some degree of lactose intolerance (especially with ice cream)  GI Symptoms: bloating, nausea, diarrhea, constipation, increased gas, abdominal pain GI Symptoms greater than 2 weeks: intermittent  Oral Problems: denies  Dentition: own  Cooking: Patient  Grocery Shopping: Patient  Alcohol: red wine 1-2 glasses daily  Dietary Supplements: Centrum Silver (tries to take it with crackers in the morning),   Food Insecurity: Denies    Physical Activity: mostly sedentary    Labs:  Lab Results   Component Value Date    HGBA1C 5.7 (A) 12/22/2022    HGBA1C 5.6 07/06/2022    HGBA1C 5.6 04/04/2022     11/02/2023    K 4.4 11/02/2023     11/02/2023    CO2 25 11/02/2023    BUN 17 11/02/2023    CREATININE 0.79 11/02/2023    CALCIUM  9.5 11/02/2023    ALBUMIN 4.4 11/02/2023    PROT 6.8 11/02/2023    BILITOT 0.4 11/02/2023    ALKPHOS 62 11/02/2023    ALT 22 11/02/2023    AST 19 11/02/2023    GLUCOSE 94 11/02/2023    CHOL 146 12/22/2022    TRIG 96 12/22/2022    HDL 62.1 12/22/2022   Comments: Low vitamin D = 28 (11/2/23). CMP: eGFR = 78 (11/2/23). Last lipid panel = normal (12/22/22). A1c = 5.7% suggestive of pre-DM (12/22/22).       Nutrition Focused Physical Exam:    Performed/Deferred: Performed    Muscle Wasting:  Temporalis: Well nourished (well-defined muscle)  Pectoralis (Clavicular Region): Well nourished (clavicle not visible)  Deltoid/Trapezius: Well nourished (rounded appearance at arm, shoulder, neck)  Interosseous: Well nourished (muscle bulges)  Quadriceps: Well nourished (well developed, well rounded)    Loss of Subcutaneous Fat:  Orbital Fat Pads: Well nourshed (slightly bulging fat pads)  Buccal Fat Pads: Well nourished (full, rounded cheeks)  Triceps: Well nourished (ample fat tissue)  Ribs: Well nourished (full chest, ribs do not protrude)    Other Physical Findings:  Hair: Negative  Eyes: Negative  Mouth: Negative  Skin: Negative  Nails: Negative    Past Medical History:  Patient Active Problem List   Diagnosis    Anxiety    Asthma    Depression    Epiretinal membrane (ERM) of both eyes    Fatty liver    Gastroesophageal reflux disease    Hearing loss    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    Hyperlipidemia    Keratoconjunctivitis sicca of both eyes not specified as Sjogren's    Major depressive disorder, single episode, unspecified    Mixed conductive and sensorineural hearing loss of right ear with restricted hearing of left ear    Nasal vestibulitis    Nuclear senile cataract of both eyes    Obesity, Class I, BMI 30-34.9    Otosclerosis involving oval window, nonobliterative    Posterior vitreous detachment of both eyes    Presbyopia    Recurrent major depressive disorder, in partial remission  "(CMS/[facility])    Recurrent UTI    Senile osteoporosis    Skin cancer    Prediabetes    Osteoporosis    Overweight with body mass index (BMI) 25.0-29.9    Breast cancer screening, high risk patient    Breast cancer screening, high risk patient        Anthropometrics:  Ht Readings from Last 1 Encounters:   02/20/24 1.575 m (5' 2\")     BMI Readings from Last 1 Encounters:   02/20/24 28.35 kg/m²     Wt Readings from Last 10 Encounters:   02/12/24 70.3 kg (155 lb)   01/23/24 70.3 kg (154 lb 15.7 oz)   01/23/24 69.9 kg (154 lb 3.2 oz)   01/18/24 70.3 kg (155 lb)   12/14/23 69.9 kg (154 lb)   01/19/23 66.2 kg (146 lb)   12/22/22 65.3 kg (144 lb)   12/22/22 65.3 kg (144 lb)   06/09/22 71.7 kg (158 lb)   03/09/22 70.8 kg (156 lb)       Estimated Energy Needs:    Total Energy Estimated Needs (kCal): 1400 kCal   Method for Estimating Needs: MSJ 1146 x 1.2   Total Protein Estimated Needs (g): 56 g   Total Protein Estimated Needs (g/kg): 0.8 g/kg    Nutrition Diagnosis     Patient has Malnutrition Diagnosis: No          Patient has Nutrition Diagnosis: Yes Diagnosis Status (1): New  Nutrition Diagnosis 1: Food and nutrition related knowledge deficit Related to (1): no prior education on low FODMAP diet As Evidenced by (1): patient's desire to learn     Diagnosis Status (2): New Nutrition Diagnosis 2: Altered GI function  Nutrition Diagnosis 2: Altered GI function Related to (2): IBS As Evidenced by (2): patient reports diarrhea, bloating, excessive gas, abdominal pain                Nutrition Interventions/Recommendations     Nutrition Education:   Provided instructions for cautious reintroduction of FODMAP containing foods. Discussed the following:   The \"Reintroduction Phase\" will last about 6 weeks. During this time, you will continue to follow a low FODMAP diet. One FODMAP group will be reintroduced each week.   The FODMAP group that is being tested should be withdrawn from the diet before moving on to the next FODMAP group.   A " sample Reintroduction Schedule has been provided along with a blank sheet. On the blank sheet, fill in what FODMAP group was reintroduced that week. For each day, fill in the specific food and amount consumed. Record any GI symptoms experienced.    If a FODMAP group is a known trigger, such as lactose, you can skip that group.  However, you may want to at least try a minimal amount of the food(s) to learn what level of tolerance that you have to those foods.   Keep in mind that you may have GI symptoms throughout this process. We are exchanging having some GI symptoms for the information. If you experience symptoms, then you can pull back from eating these foods before the symptoms become too problematic.   Schedule a follow-up visit in 6 weeks so we can discuss the results of the reintroduction.   As a reminder, the goal is that this is a temporary process to learn what foods are triggering GI symptoms, and a low FODMAP diet should not be followed long-term.       *Patient expressed understanding of the nutrition education and denied any additional questions/concerns.     Educational Handouts: FODMAP Elimination Diet Overview by Anais Vang    Nutrition Monitoring and Evaluation   Estimated PO intake > 75% estimated energy needs - met, ongoing   Improvement in patient's self-reported GI symptoms - met, ongoing       Readiness to Change : Excellent  Level of Understanding : Excellent  Anticipated Compliant : Excellent     Follow-up: 6 weeks

## 2024-03-06 ENCOUNTER — NUTRITION (OUTPATIENT)
Dept: PRIMARY CARE | Facility: CLINIC | Age: 79
End: 2024-03-06
Payer: MEDICARE

## 2024-03-06 DIAGNOSIS — R14.0 ABDOMINAL BLOATING: ICD-10-CM

## 2024-03-06 DIAGNOSIS — Z71.3 DIETARY COUNSELING AND SURVEILLANCE: Primary | ICD-10-CM

## 2024-03-06 DIAGNOSIS — K76.0 FATTY LIVER: ICD-10-CM

## 2024-03-06 DIAGNOSIS — K58.0 IRRITABLE BOWEL SYNDROME WITH DIARRHEA: ICD-10-CM

## 2024-03-06 PROCEDURE — 97803 MED NUTRITION INDIV SUBSEQ: CPT

## 2024-04-15 ENCOUNTER — OFFICE VISIT (OUTPATIENT)
Dept: GASTROENTEROLOGY | Facility: HOSPITAL | Age: 79
End: 2024-04-15
Payer: MEDICARE

## 2024-04-15 VITALS
RESPIRATION RATE: 20 BRPM | OXYGEN SATURATION: 95 % | BODY MASS INDEX: 29.45 KG/M2 | TEMPERATURE: 96.9 F | DIASTOLIC BLOOD PRESSURE: 85 MMHG | WEIGHT: 161 LBS | HEART RATE: 69 BPM | SYSTOLIC BLOOD PRESSURE: 133 MMHG

## 2024-04-15 DIAGNOSIS — M62.89 PELVIC FLOOR DYSFUNCTION IN FEMALE: ICD-10-CM

## 2024-04-15 DIAGNOSIS — K58.0 IRRITABLE BOWEL SYNDROME WITH DIARRHEA: Primary | ICD-10-CM

## 2024-04-15 DIAGNOSIS — R12 HEARTBURN: ICD-10-CM

## 2024-04-15 DIAGNOSIS — K76.0 FATTY LIVER: ICD-10-CM

## 2024-04-15 DIAGNOSIS — R14.0 ABDOMINAL BLOATING: ICD-10-CM

## 2024-04-15 DIAGNOSIS — Z11.59 ENCOUNTER FOR HEPATITIS C SCREENING TEST FOR LOW RISK PATIENT: ICD-10-CM

## 2024-04-15 DIAGNOSIS — D13.0 LEIOMYOMA OF ESOPHAGUS: ICD-10-CM

## 2024-04-15 PROCEDURE — 1125F AMNT PAIN NOTED PAIN PRSNT: CPT | Performed by: INTERNAL MEDICINE

## 2024-04-15 PROCEDURE — 99215 OFFICE O/P EST HI 40 MIN: CPT | Performed by: INTERNAL MEDICINE

## 2024-04-15 PROCEDURE — 1160F RVW MEDS BY RX/DR IN RCRD: CPT | Performed by: INTERNAL MEDICINE

## 2024-04-15 PROCEDURE — 1159F MED LIST DOCD IN RCRD: CPT | Performed by: INTERNAL MEDICINE

## 2024-04-15 RX ORDER — MOMETASONE FUROATE 50 UG/1
SPRAY, METERED NASAL
COMMUNITY

## 2024-04-15 ASSESSMENT — PAIN SCALES - GENERAL: PAINLEVEL: 2

## 2024-04-15 NOTE — LETTER
April 16, 2024     Marko Baker MD  [address]   [facility] [address]  [facility] [address]    Patient: Juan Alberto Alcantara   YOB: 1945   Date of Visit: 4/15/2024       Dear Dr. Marko Baker MD:    Thank you for referring Juan Alberto Alcantara to me for evaluation. Below are my notes for this consultation.  If you have questions, please do not hesitate to call me. I look forward to following your patient along with you.       Sincerely,     Adriano Rollins MD      CC: No Recipients  ______________________________________________________________________________________    Subjective   Patient ID:   Ms. Alcantara is a 78 year old woman with BMI = 28, history of squamous cell cancer and basal cell cancer of skin, hyperlipidemia, prediabetes (last HbA1c = 5.7% 12/22/22), asthma/COPD (former smoker), anxiety, depression (on fluoxetine), metabolic dysfunction-associated steatotic liver disease (hepatic steatosis noted on RUQ ultrasound 5/23/14 at [facility]), diarrhea-predominant irritable bowel syndrome, benign esophageal leiomyoma (diagnosed 2014), recurrent UTIs (on prophylactic Macrobid PRN), stress urinary incontinence, small benign R renal parapelvic cyst, essential tremor (on propranolol), osteoporosis, and DJD who is here for follow up re: diarrhea-predominant IBS, pelvic floor dysfunction, history of esophageal leiomyoma, and MASLD.    The patient initially saw me 2/12/24 per request of Dr. Marko Baker for a second opinion re: her IBS. See note from that date for full details. The patient has been previously followed by Dr. Wallace Deutsch - [facility]. 324 pages of records were received after COLTON was obtained, and these records were reviewed prior to her appointment today.    At her initial visit, I had a long discussion with her re: diarrhea-predominant IBS and management. Due to her history of repeated antibiotic use for recurrent UTIs, I recommended stool test for C difficile.  However, she had submitted a formed stool so the test could not be done. She did not re-submit a stool sample.    She also decided to proceed with taking Metamucil (as opposed to Citrucel which was previously discussed with her given her previous experience with Metamucil. She had told me at her initial visit that she associated taking Metamucil with her brother having to take it while growing up after he was diagnosed with thyroid cancer at age 11. She stated that thinking about Metamucil makes her want to throw up due to the association with her brother having to take it.    In addition, she was referred to Nutrition for supervised low FODMAP diet. She has been seen twice by Ms. Lancaster 2/20/24 and 3/6/24. At her visit on 3/6/24, she reported to Ms. Lancaster that she had 60-70% improvement in diarrhea, 60% improvement in abdominal pain, and 30-40% improvement in gassiness.  She has an upcoming appointment on 4/17/24.    She has not seen Dr. Baker since I saw her. She does not have an appointment to see him for follow up as suggested previously.    She reports she is extremely happy with her treatment thus far. She reports that her bowel movements are more formed, and there have been instances in which she has been constipated. Occasionally she will have a hard stool, and she will have to strain. She states she has noted that her hemorrhoids have been more noticeable.  She denies any hematochezia.    She has been restrictive with her low FODMAP diet, and she has not yet re-introduced food groups. She states she has been fearful to do so in that if she does, she will have recurrent diarrhea. She states she has eaten only 2 servings of vegetables (salad and green beans) and 1 serving of fruit (pineapple) since starting the low FODMAP diet. She confirms her symptom response that she mentioned to Ms. Lancaster, and she states she has been able to go on vacation to [place] without issues, and she has been able to go to restaurants  without fear.  She states her quality of life has greatly improved.    She states she got Metamucil gummies instead of the powder. She reports she only uses them occasionally.     She still feels bloated. When she was in [place], she was in a rental car, and she had to adjust the seat belt so that the lap belt was more up closer to her chest.  Documented weights (in pounds; on various, different scales) include 144 12/22/22, 146 1/19/23, 155 1/18/24, 154 1/23/24, 155 2/12/24, and 161 today.  She states she has been eating more carbs, and as a result, she has gained weight.    She complains of continued chronic fatigue. She has not seen Dr. Baker for this issue yet.    Previous evaluation at [facility] has included:  5/23/14 RUQ ultrasound: hepatic steatosis with focal fatty sparing left hepatic lobe; normal gallbladder, bile ducts, and pancreas  6/7/14 Colonoscopy: procedure report not in Epic Everywhere nor in records provided by Dr. Deutsch's office  11/25/14 EGD with EUS: esophageal leiomyoma at 33 cm measuring 13.9 mm x 5.1 mm  6/3/16 EGD with EUS: submucosal nodule in esophagus otherwise normal with EUS showing 14.1 mm x 5.5 mm esophageal leiomyoma (path of distal esophageal biopsies = normal); gastritis (path = mild chronic inactive gastritis; no H pylori); normal duodenum  7/21/16 MRI of abdomen: focal fatty infiltration of left hepatic lobe; no mass; normal spleen, gallbladder, bile ducts, pancreas, kidneys, and bowel  10/4/18 EGD with EUS: stable 1.22 cm x 0.63 cm esophageal leiomyoma  8/14/19 Colonoscopy: procedure report not in Epic Everywhere nor in records provided by Dr. Deutsch's office  6/2022 Hydrogen breath test for SIBO: results not in Epic Everywhere nor in records provided by Dr. Deutsch's office  9/21/22 EGD with EUS with MAC: small hiatal hernia; widely patent Schatzki ring disrupted with biopsy forceps with biopsies (path = normal); 12.9 mm x 5.3 mm esophageal leiomyoma with focal  calcification at 32 cm from incisors (no change in appearance or size compared to previous studies since 2014; Dr. Deutsch recommended no further surveillance, but the patient had requested a repeat EUS in 2 years); polypoid antral gastritis (biopsied; path = reactive gastropathy and PPI effect); otherwise, normal stomach; normal duodenum (biopsied; path = normal; no celiac disease).    Labs at  have included a normal CMP and CBC 11/3/23. A TSH 4/4/22 at [facility] was normal. Hepatitis C screening was done 4/20/16 at the [facility] but the results are not in Epic Everywhere nor were they in records sent by Dr. Deutsch's office.    Objective   Physical Exam  Constitutional:       Appearance: Normal appearance. She is obese.   HENT:      Mouth/Throat:      Mouth: Mucous membranes are moist.   Eyes:      Conjunctiva/sclera: Conjunctivae normal.   Cardiovascular:      Rate and Rhythm: Normal rate.   Pulmonary:      Effort: Pulmonary effort is normal.   Abdominal:      General: Bowel sounds are normal. There is no distension.      Palpations: Abdomen is soft. There is no mass.      Tenderness: There is no abdominal tenderness. There is no guarding or rebound.      Hernia: No hernia is present.   Skin:     General: Skin is warm.   Neurological:      Mental Status: She is oriented to person, place, and time.   Psychiatric:         Mood and Affect: Mood normal.         Assessment/Plan     Diarrhea-predominant irritable bowel syndrome, long-standing, without red flag symptoms or signs with psychosocial difficulties including anxiety and depression. I had previously recommended C difficile stool study given repeated antibiotic usage for recurrent UTIs. However, she had submitted a formed stool, so the lab was unable to test. She has not re-submitted a stool sample. She reports that with the low FODMAP she has had significant improvement in her symptoms, and she is very pleased with the quality of her life at this time. She  was reminded that a low FODMAP diet is not meant to be restrictive, but the point is to identify a particular food group that she is intolerant. She has an upcoming follow up appointment with Ms. Anisha Calhoun on 4/17/24. She was taking Metamucil gummies (instead of previously recommended Citrucel - see HPI), and she was taking them infrequently. She has had more recent issues with constipation and straining with exacerbation of her external hemorrhoids. After discussion with her, she will try Optifiber ([employer] brand of wheat dextrin fiber supplement). I also discussed potential use with low dose amitriptyline, but the patient would prefer to continue with the low FODMAP diet and initiation of fiber supplementation as noted above.     Pelvic floor dysfunction with stress urinary incontinence and dysfunctional defecatory pattern with straining. I discussed options for further evaluation including anorectal manometry and MRI defecography with potential management including pelvic floor physical therapy. She should also see a urogynecologist for additional evaluation and treatment options. After discussion, the patient wishes to hold off on further evaluation.    Esophageal leiomyoma noted since 2014. Risk of developing malignancy is extremely low. She has had serial EGD and EUS procedures with her last one done 9/2022. Dr. Deutsch recommended no further surveillance given stability over the past 8 years, but the patient had requested a repeat EUS in 2 years (September 2024).     Metabolic dysfunction-associated steatotic liver disease in setting of BMI = 28, hyperlipidemia, and prediabetes. The patient should follow up with her PCP for a repeat HbA1c (last one was done 12/22/22) and weight loss programs. She should also stop alcohol use (1 - 2 glasses red wine daily). I recommended additional evaluation including repeat liver ultrasound (given that it has been 10 years since her last ultrasound) and Fibroscan,  depending on ultrasound results. She agrees to proceed.    Hepatitis C screening.  Hepatitis C screening was done 4/20/16 at the [facility] but the results are not in Epic Everywhere nor in the records from Dr. Deutsch's office. She agrees to have this test done.    Average risk colorectal cancer screening. The patient had a screening colonoscopy done 2019 which was normal. Further screening is not indicated given advanced age.    Chronic fatigue. She will follow up with Dr. Baker for further evaluation and management, including follow-up HbA1c.    Medical decision-making and time spent in both non-face-to-face time and face-to-face included time spent preparing to see patient, obtaining and/or reviewing separately obtained history, review and/or ordering of labs, radiology studies (including personal review of imaging), medical tests, and/or prior medical records (including summary of records), independently interpreting results and/or communicating results to patient, review and/or ordering of endoscopic procedures with risk factors, performing a medically necessary appropriate physical examination, counseling and educating the patient, communicating with other providers, care coordination, and documenting clinical information as noted above.    All the patient's questions were answered to her full satisfaction.     Adriano Rollins MD, TANVI  Chief Medical   [facility]  Associate Chief and Director of Clinical Operations  Division of Gastroenterology and Liver Disease  [facility] Master Clinician  Professor of Medicine  [facility]    cc: Marko Baker MD    Time Spent  Prep time on day of patient encounter: 135 minutes  Time spent directly with patient, family or caregiver: 35 minutes  Additional Time Spent on Patient Care Activities: 10 minutes  Documentation Time: 40 minutes  Other Time Spent: 0  minutes  Total: 220 minutes

## 2024-04-15 NOTE — PROGRESS NOTES
Subjective   Patient ID:   Ms. Alcantara is a 78 year old woman with BMI = 28, history of squamous cell cancer and basal cell cancer of skin, hyperlipidemia, prediabetes (last HbA1c = 5.7% 12/22/22), asthma/COPD (former smoker), anxiety, depression (on fluoxetine), metabolic dysfunction-associated steatotic liver disease (hepatic steatosis noted on RUQ ultrasound 5/23/14 at [facility]), diarrhea-predominant irritable bowel syndrome, benign esophageal leiomyoma (diagnosed 2014), recurrent UTIs (on prophylactic Macrobid PRN), stress urinary incontinence, small benign R renal parapelvic cyst, essential tremor (on propranolol), osteoporosis, and DJD who is here for follow up re: diarrhea-predominant IBS, pelvic floor dysfunction, history of esophageal leiomyoma, and MASLD.    The patient initially saw me 2/12/24 per request of Dr. Marko Baker for a second opinion re: her IBS. See note from that date for full details. The patient has been previously followed by Dr. Wallace Deutsch - [facility]. 324 pages of records were received after COLTON was obtained, and these records were reviewed prior to her appointment today.    At her initial visit, I had a long discussion with her re: diarrhea-predominant IBS and management. Due to her history of repeated antibiotic use for recurrent UTIs, I recommended stool test for C difficile. However, she had submitted a formed stool so the test could not be done. She did not re-submit a stool sample.    She also decided to proceed with taking Metamucil (as opposed to Citrucel which was previously discussed with her given her previous experience with Metamucil. She had told me at her initial visit that she associated taking Metamucil with her brother having to take it while growing up after he was diagnosed with thyroid cancer at age 11. She stated that thinking about Metamucil makes her want to throw up due to the association with her brother having to take  it.    In addition, she was referred to Nutrition for supervised low FODMAP diet. She has been seen twice by Ms. Lancaster 2/20/24 and 3/6/24. At her visit on 3/6/24, she reported to Ms. Lancaster that she had 60-70% improvement in diarrhea, 60% improvement in abdominal pain, and 30-40% improvement in gassiness.  She has an upcoming appointment on 4/17/24.    She has not seen Dr. Baker since I saw her. She does not have an appointment to see him for follow up as suggested previously.    She reports she is extremely happy with her treatment thus far. She reports that her bowel movements are more formed, and there have been instances in which she has been constipated. Occasionally she will have a hard stool, and she will have to strain. She states she has noted that her hemorrhoids have been more noticeable.  She denies any hematochezia.    She has been restrictive with her low FODMAP diet, and she has not yet re-introduced food groups. She states she has been fearful to do so in that if she does, she will have recurrent diarrhea. She states she has eaten only 2 servings of vegetables (salad and green beans) and 1 serving of fruit (pineapple) since starting the low FODMAP diet. She confirms her symptom response that she mentioned to Ms. Lancaster, and she states she has been able to go on vacation to [place] without issues, and she has been able to go to restaurants without fear.  She states her quality of life has greatly improved.    She states she got Metamucil gummies instead of the powder. She reports she only uses them occasionally.     She still feels bloated. When she was in [place], she was in a rental car, and she had to adjust the seat belt so that the lap belt was more up closer to her chest.  Documented weights (in pounds; on various, different scales) include 144 12/22/22, 146 1/19/23, 155 1/18/24, 154 1/23/24, 155 2/12/24, and 161 today.  She states she has been eating more carbs, and as a result, she has gained  weight.    She complains of continued chronic fatigue. She has not seen Dr. Baker for this issue yet.    Previous evaluation at [facility] has included:  5/23/14 RUQ ultrasound: hepatic steatosis with focal fatty sparing left hepatic lobe; normal gallbladder, bile ducts, and pancreas  6/7/14 Colonoscopy: procedure report not in Epic Everywhere nor in records provided by Dr. Deutsch's office  11/25/14 EGD with EUS: esophageal leiomyoma at 33 cm measuring 13.9 mm x 5.1 mm  6/3/16 EGD with EUS: submucosal nodule in esophagus otherwise normal with EUS showing 14.1 mm x 5.5 mm esophageal leiomyoma (path of distal esophageal biopsies = normal); gastritis (path = mild chronic inactive gastritis; no H pylori); normal duodenum  7/21/16 MRI of abdomen: focal fatty infiltration of left hepatic lobe; no mass; normal spleen, gallbladder, bile ducts, pancreas, kidneys, and bowel  10/4/18 EGD with EUS: stable 1.22 cm x 0.63 cm esophageal leiomyoma  8/14/19 Colonoscopy: procedure report not in Epic Everywhere nor in records provided by Dr. Deutsch's office  6/2022 Hydrogen breath test for SIBO: results not in Epic Everywhere nor in records provided by Dr. Deutsch's office  9/21/22 EGD with EUS with MAC: small hiatal hernia; widely patent Schatzki ring disrupted with biopsy forceps with biopsies (path = normal); 12.9 mm x 5.3 mm esophageal leiomyoma with focal calcification at 32 cm from incisors (no change in appearance or size compared to previous studies since 2014; Dr. Deutsch recommended no further surveillance, but the patient had requested a repeat EUS in 2 years); polypoid antral gastritis (biopsied; path = reactive gastropathy and PPI effect); otherwise, normal stomach; normal duodenum (biopsied; path = normal; no celiac disease).    Labs at  have included a normal CMP and CBC 11/3/23. A TSH 4/4/22 at [facility] was normal. Hepatitis C screening was done 4/20/16 at the [facility] but the results are not in [facility]  Everywhere nor were they in records sent by Dr. Deutsch's office.    Objective   Physical Exam  Constitutional:       Appearance: Normal appearance. She is obese.   HENT:      Mouth/Throat:      Mouth: Mucous membranes are moist.   Eyes:      Conjunctiva/sclera: Conjunctivae normal.   Cardiovascular:      Rate and Rhythm: Normal rate.   Pulmonary:      Effort: Pulmonary effort is normal.   Abdominal:      General: Bowel sounds are normal. There is no distension.      Palpations: Abdomen is soft. There is no mass.      Tenderness: There is no abdominal tenderness. There is no guarding or rebound.      Hernia: No hernia is present.   Skin:     General: Skin is warm.   Neurological:      Mental Status: She is oriented to person, place, and time.   Psychiatric:         Mood and Affect: Mood normal.         Assessment/Plan     Diarrhea-predominant irritable bowel syndrome, long-standing, without red flag symptoms or signs with psychosocial difficulties including anxiety and depression. I had previously recommended C difficile stool study given repeated antibiotic usage for recurrent UTIs. However, she had submitted a formed stool, so the lab was unable to test. She has not re-submitted a stool sample. She reports that with the low FODMAP she has had significant improvement in her symptoms, and she is very pleased with the quality of her life at this time. She was reminded that a low FODMAP diet is not meant to be restrictive, but the point is to identify a particular food group that she is intolerant. She has an upcoming follow up appointment with Ms. Lancaster - Nutrition on 4/17/24. She was taking Metamucil gummies (instead of previously recommended Citrucel - see HPI), and she was taking them infrequently. She has had more recent issues with constipation and straining with exacerbation of her external hemorrhoids. After discussion with her, she will try Optifiber ([employer] brand of wheat dextrin fiber supplement). I also  discussed potential use with low dose amitriptyline, but the patient would prefer to continue with the low FODMAP diet and initiation of fiber supplementation as noted above.     Pelvic floor dysfunction with stress urinary incontinence and dysfunctional defecatory pattern with straining. I discussed options for further evaluation including anorectal manometry and MRI defecography with potential management including pelvic floor physical therapy. She should also see a urogynecologist for additional evaluation and treatment options. After discussion, the patient wishes to hold off on further evaluation.    Esophageal leiomyoma noted since 2014. Risk of developing malignancy is extremely low. She has had serial EGD and EUS procedures with her last one done 9/2022. Dr. Deutsch recommended no further surveillance given stability over the past 8 years, but the patient had requested a repeat EUS in 2 years (September 2024).     Metabolic dysfunction-associated steatotic liver disease in setting of BMI = 28, hyperlipidemia, and prediabetes. The patient should follow up with her PCP for a repeat HbA1c (last one was done 12/22/22) and weight loss programs. She should also stop alcohol use (1 - 2 glasses red wine daily). I recommended additional evaluation including repeat liver ultrasound (given that it has been 10 years since her last ultrasound) and Fibroscan, depending on ultrasound results. She agrees to proceed.    Hepatitis C screening.  Hepatitis C screening was done 4/20/16 at the [facility] but the results are not in Epic Everywhere nor in the records from Dr. Deutsch's office. She agrees to have this test done.    Average risk colorectal cancer screening. The patient had a screening colonoscopy done 2019 which was normal. Further screening is not indicated given advanced age.    Chronic fatigue. She will follow up with Dr. Baker for further evaluation and management, including follow-up HbA1c.    Medical  decision-making and time spent in both non-face-to-face time and face-to-face included time spent preparing to see patient, obtaining and/or reviewing separately obtained history, review and/or ordering of labs, radiology studies (including personal review of imaging), medical tests, and/or prior medical records (including summary of records), independently interpreting results and/or communicating results to patient, review and/or ordering of endoscopic procedures with risk factors, performing a medically necessary appropriate physical examination, counseling and educating the patient, communicating with other providers, care coordination, and documenting clinical information as noted above.    All the patient's questions were answered to her full satisfaction.     Adriano Rollins MD, TANVI  Chief Medical   [facility]  Associate Chief and Director of Clinical Operations  Division of Gastroenterology and Liver Disease  [facility] Master Clinician  Professor of Medicine  [facility]    cc: Marko Baker MD    Time Spent  Prep time on day of patient encounter: 135 minutes  Time spent directly with patient, family or caregiver: 35 minutes  Additional Time Spent on Patient Care Activities: 10 minutes  Documentation Time: 40 minutes  Other Time Spent: 0 minutes  Total: 220 minutes

## 2024-04-15 NOTE — PATIENT INSTRUCTIONS
Health Maintenance Due   Topic Date Due   • COVID-19 Vaccine (1) Never done   • DTaP/Tdap/Td Vaccine (7 - Td or Tdap) 07/22/2019       Patient is up to date, no discussion needed. Declined vaccines.    Thank you for seeing me! You report you are quite happy with how you have improved on the low FODMAP diet. Continue to see Ms. Lancaster as the diet is NOT meant to be restrictive, and we need to re-introduce food groups to identify which of them is causing you more problems. Also, you should increase fiber intake as we discussed to not only help with your IBS but also your hemorrhoids. You can buy Optifiber from [employer]; take 1 tablespoon daily. We discussed the fatty liver you have had in the past. I have ordered the hepatitis C screening test, and I have ordered the liver ultrasound. Follow up with me in 2 months.  You can contact my office at [phone] with any questions or concerns that arise before then.

## 2024-04-17 ENCOUNTER — APPOINTMENT (OUTPATIENT)
Dept: PRIMARY CARE | Facility: CLINIC | Age: 79
End: 2024-04-17
Payer: MEDICARE

## 2024-04-17 NOTE — PROGRESS NOTES
Nutrition: Follow-up     Reason for Nutrition Visit: Patient is a 78 y.o. female referred for IBS-D, fatty liver, and abdominal bloating. Referred on 2/12/24 by Dr. Rollins. Per chart review, GI provider would like patient to try low FODMAP diet.     Nutrition Assessment    Food and Nutrient History:     Review wt changes, changes in meds, changes in supplements   Discuss symptoms and logs     Visit #2  Has noticed significantly less diarrhea (has noticed 60-70% improvement), abdominal pain (has noticed 60% improvement), gassiness (has noticed 30-40% improvement). Still feels bloating.     Visit #1  Pt presents for nutrition counseling. Has significant IBS, predominantly diarrhea. Ongoing for years. Has abdominal pain, excessive gas (in the evening), bloating. Most of her life, she struggled with constipation. In more recent years, diarrhea is predominate symptom. Has loose, watery BM 4-5x per day. Sometimes needs to go within 10-15 minutes after eating and other times it seems to be more erratic. Does not eat meat. Does eat poultry and fish. Followed a vegetarian diet briefly and developed anemia. Going to bed at 1-3am. Feels very hungry in the later evening hours. Lives alone and enjoys eating out. Has been taking Imodium as needed.     ----------------------  Food Allergy: none  Food Intolerance: sugar alcohols, some degree of lactose intolerance (especially with ice cream)  GI Symptoms: bloating, nausea, diarrhea, constipation, increased gas, abdominal pain GI Symptoms greater than 2 weeks: intermittent  Oral Problems: denies  Dentition: own  Cooking: Patient  Grocery Shopping: Patient  Alcohol: red wine 1-2 glasses daily  Dietary Supplements: Centrum Silver (tries to take it with crackers in the morning),   Food Insecurity: Denies    Physical Activity: mostly sedentary    Labs:  Lab Results   Component Value Date    HGBA1C 5.7 (A) 12/22/2022    HGBA1C 5.6 07/06/2022    HGBA1C 5.6 04/04/2022      11/02/2023    K 4.4 11/02/2023     11/02/2023    CO2 25 11/02/2023    BUN 17 11/02/2023    CREATININE 0.79 11/02/2023    CALCIUM 9.5 11/02/2023    ALBUMIN 4.4 11/02/2023    PROT 6.8 11/02/2023    BILITOT 0.4 11/02/2023    ALKPHOS 62 11/02/2023    ALT 22 11/02/2023    AST 19 11/02/2023    GLUCOSE 94 11/02/2023    CHOL 146 12/22/2022    TRIG 96 12/22/2022    HDL 62.1 12/22/2022   Comments: Low vitamin D = 28 (11/2/23). CMP: eGFR = 78 (11/2/23). Last lipid panel = normal (12/22/22). A1c = 5.7% suggestive of pre-DM (12/22/22).       Nutrition Focused Physical Exam:    Performed/Deferred: Performed    Muscle Wasting:  Temporalis: Well nourished (well-defined muscle)  Pectoralis (Clavicular Region): Well nourished (clavicle not visible)  Deltoid/Trapezius: Well nourished (rounded appearance at arm, shoulder, neck)  Interosseous: Well nourished (muscle bulges)  Quadriceps: Well nourished (well developed, well rounded)    Loss of Subcutaneous Fat:  Orbital Fat Pads: Well nourshed (slightly bulging fat pads)  Buccal Fat Pads: Well nourished (full, rounded cheeks)  Triceps: Well nourished (ample fat tissue)  Ribs: Well nourished (full chest, ribs do not protrude)    Other Physical Findings:  Hair: Negative  Eyes: Negative  Mouth: Negative  Skin: Negative  Nails: Negative    Past Medical History:  Patient Active Problem List   Diagnosis    Anxiety    Asthma    Depression    Epiretinal membrane (ERM) of both eyes    Fatty liver    Gastroesophageal reflux disease    Hearing loss    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    Hyperlipidemia    Keratoconjunctivitis sicca of both eyes not specified as Sjogren's    Major depressive disorder, single episode, unspecified    Mixed conductive and sensorineural hearing loss of right ear with restricted hearing of left ear    Nasal vestibulitis    Nuclear senile cataract of both eyes    Obesity, Class I, BMI 30-34.9    Otosclerosis involving oval window,  "nonobliterative    Posterior vitreous detachment of both eyes    Presbyopia    Recurrent major depressive disorder, in partial remission (CMS/HCC)    Recurrent UTI    Senile osteoporosis    Skin cancer    Prediabetes    Osteoporosis    Overweight with body mass index (BMI) 25.0-29.9    Breast cancer screening, high risk patient    Breast cancer screening, high risk patient        Anthropometrics:  Ht Readings from Last 1 Encounters:   02/20/24 1.575 m (5' 2\")     BMI Readings from Last 1 Encounters:   04/15/24 29.45 kg/m²     Wt Readings from Last 10 Encounters:   04/15/24 73 kg (161 lb)   02/12/24 70.3 kg (155 lb)   01/23/24 70.3 kg (154 lb 15.7 oz)   01/23/24 69.9 kg (154 lb 3.2 oz)   01/18/24 70.3 kg (155 lb)   12/14/23 69.9 kg (154 lb)   01/19/23 66.2 kg (146 lb)   12/22/22 65.3 kg (144 lb)   12/22/22 65.3 kg (144 lb)   06/09/22 71.7 kg (158 lb)     Weight Change: Wt gain of 6 lbs x 2 months     Estimated Energy Needs:    Total Energy Estimated Needs (kCal): 1400 kCal   Method for Estimating Needs: MSJ 1146 x 1.2   Total Protein Estimated Needs (g): 56 g   Total Protein Estimated Needs (g/kg): 0.8 g/kg    Nutrition Diagnosis     Patient has Malnutrition Diagnosis: No          Patient has Nutrition Diagnosis: Yes Diagnosis Status (1): New  Nutrition Diagnosis 1: Food and nutrition related knowledge deficit Related to (1): no prior education on low FODMAP diet As Evidenced by (1): patient's desire to learn     Diagnosis Status (2): New Nutrition Diagnosis 2: Altered GI function  Nutrition Diagnosis 2: Altered GI function Related to (2): IBS As Evidenced by (2): patient reports diarrhea, bloating, excessive gas, abdominal pain                Nutrition Interventions/Recommendations     Nutrition Education:     1) Reviewed two long term goals of good symptom management AND a diverse, nutritious diet.   2) Discussed importance of consuming prebiotic/probiotic-containing foods                                   Provided " "instructions for cautious reintroduction of FODMAP containing foods. Discussed the following:   The \"Reintroduction Phase\" will last about 6 weeks. During this time, you will continue to follow a low FODMAP diet. One FODMAP group will be reintroduced each week.   The FODMAP group that is being tested should be withdrawn from the diet before moving on to the next FODMAP group.   A sample Reintroduction Schedule has been provided along with a blank sheet. On the blank sheet, fill in what FODMAP group was reintroduced that week. For each day, fill in the specific food and amount consumed. Record any GI symptoms experienced.    If a FODMAP group is a known trigger, such as lactose, you can skip that group.  However, you may want to at least try a minimal amount of the food(s) to learn what level of tolerance that you have to those foods.   Keep in mind that you may have GI symptoms throughout this process. We are exchanging having some GI symptoms for the information. If you experience symptoms, then you can pull back from eating these foods before the symptoms become too problematic.   Schedule a follow-up visit in 6 weeks so we can discuss the results of the reintroduction.   As a reminder, the goal is that this is a temporary process to learn what foods are triggering GI symptoms, and a low FODMAP diet should not be followed long-term.       *Patient expressed understanding of the nutrition education and denied any additional questions/concerns.     Educational Handouts: FODMAP Elimination Diet Overview by Anais Vang    Nutrition Monitoring and Evaluation   Estimated PO intake > 75% estimated energy needs - met, ongoing   Improvement in patient's self-reported GI symptoms - met, ongoing       Readiness to Change : Excellent  Level of Understanding : Excellent  Anticipated Compliant : Excellent     Follow-up: 6 weeks   "

## 2024-04-18 ENCOUNTER — TELEPHONE (OUTPATIENT)
Dept: PRIMARY CARE | Facility: CLINIC | Age: 79
End: 2024-04-18
Payer: MEDICARE

## 2024-04-18 DIAGNOSIS — B00.9 HERPES: Primary | ICD-10-CM

## 2024-04-18 RX ORDER — VALACYCLOVIR HYDROCHLORIDE 1 G/1
1000 TABLET, FILM COATED ORAL 3 TIMES DAILY
Qty: 21 TABLET | Refills: 0 | Status: SHIPPED | OUTPATIENT
Start: 2024-04-18 | End: 2024-04-25

## 2024-04-19 DIAGNOSIS — B00.9 HSV (HERPES SIMPLEX VIRUS) INFECTION: Primary | ICD-10-CM

## 2024-04-19 RX ORDER — VALACYCLOVIR HYDROCHLORIDE 1 G/1
1000 TABLET, FILM COATED ORAL 2 TIMES DAILY
Qty: 14 TABLET | Refills: 0 | Status: SHIPPED | OUTPATIENT
Start: 2024-04-19 | End: 2024-04-26

## 2024-04-23 NOTE — PROGRESS NOTES
Nutrition: Follow-up     Reason for Nutrition Visit: Patient is a 78 y.o. female referred for IBS-D, fatty liver, and abdominal bloating. Referred on 2/12/24 by Dr. Rollins. Per chart review, GI provider would like patient to try low FODMAP diet.     Nutrition Assessment      Visit #3  Ms. Alcantara presents in office. Stressful month and had difficulty reintroducing foods. She did do some reintroduction, such as broccoli, grapes. Both seemed to give her GI symptoms. Mainly eating potatoes, noodles, bread and other easy to digest foods. Has really limited seasonings. Understands the importance of reintroducing, and she is willing to try these next few weeks.     Visit #2  Has noticed significantly less diarrhea (has noticed 60-70% improvement), abdominal pain (has noticed 60% improvement), gassiness (has noticed 30-40% improvement). Still feels bloating.     Visit #1  Pt presents for nutrition counseling. Has significant IBS, predominantly diarrhea. Ongoing for years. Has abdominal pain, excessive gas (in the evening), bloating. Most of her life, she struggled with constipation. In more recent years, diarrhea is predominate symptom. Has loose, watery BM 4-5x per day. Sometimes needs to go within 10-15 minutes after eating and other times it seems to be more erratic. Does not eat meat. Does eat poultry and fish. Followed a vegetarian diet briefly and developed anemia. Going to bed at 1-3am. Feels very hungry in the later evening hours. Lives alone and enjoys eating out. Has been taking Imodium as needed.     ----------------------  Food Allergy: none  Food Intolerance: sugar alcohols, some degree of lactose intolerance (especially with ice cream)  GI Symptoms: bloating, nausea, diarrhea, constipation, increased gas, abdominal pain GI Symptoms greater than 2 weeks: intermittent  Oral Problems: denies  Dentition: own  Cooking: Patient  Grocery Shopping: Patient  Alcohol: red wine 1-2 glasses daily  Dietary Supplements:  Centrum Silver (tries to take it with crackers in the morning),   Food Insecurity: Denies    Physical Activity: mostly sedentary    Labs:  Lab Results   Component Value Date    HGBA1C 5.7 (A) 12/22/2022    HGBA1C 5.6 07/06/2022    HGBA1C 5.6 04/04/2022     11/02/2023    K 4.4 11/02/2023     11/02/2023    CO2 25 11/02/2023    BUN 17 11/02/2023    CREATININE 0.79 11/02/2023    CALCIUM 9.5 11/02/2023    ALBUMIN 4.4 11/02/2023    PROT 6.8 11/02/2023    BILITOT 0.4 11/02/2023    ALKPHOS 62 11/02/2023    ALT 22 11/02/2023    AST 19 11/02/2023    GLUCOSE 94 11/02/2023    CHOL 146 12/22/2022    TRIG 96 12/22/2022    HDL 62.1 12/22/2022   Comments: Low vitamin D = 28 (11/2/23). CMP: eGFR = 78 (11/2/23). Last lipid panel = normal (12/22/22). A1c = 5.7% suggestive of pre-DM (12/22/22).       Nutrition Focused Physical Exam:    Performed/Deferred: Performed    Muscle Wasting:  Temporalis: Well nourished (well-defined muscle)  Pectoralis (Clavicular Region): Well nourished (clavicle not visible)  Deltoid/Trapezius: Well nourished (rounded appearance at arm, shoulder, neck)  Interosseous: Well nourished (muscle bulges)  Quadriceps: Well nourished (well developed, well rounded)    Loss of Subcutaneous Fat:  Orbital Fat Pads: Well nourshed (slightly bulging fat pads)  Buccal Fat Pads: Well nourished (full, rounded cheeks)  Triceps: Well nourished (ample fat tissue)  Ribs: Well nourished (full chest, ribs do not protrude)    Other Physical Findings:  Hair: Negative  Eyes: Negative  Mouth: Negative  Skin: Negative  Nails: Negative    Past Medical History:  Patient Active Problem List   Diagnosis    Anxiety    Asthma    Depression    Epiretinal membrane (ERM) of both eyes    Fatty liver    Gastroesophageal reflux disease    Hearing loss    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    Hyperlipidemia    Keratoconjunctivitis sicca of both eyes not specified as Sjogren's    Major depressive disorder, single  "episode, unspecified    Mixed conductive and sensorineural hearing loss of right ear with restricted hearing of left ear    Nasal vestibulitis    Nuclear senile cataract of both eyes    Obesity, Class I, BMI 30-34.9    Otosclerosis involving oval window, nonobliterative    Posterior vitreous detachment of both eyes    Presbyopia    Recurrent major depressive disorder, in partial remission (CMS/HCC)    Recurrent UTI    Senile osteoporosis    Skin cancer    Prediabetes    Osteoporosis    Overweight with body mass index (BMI) 25.0-29.9    Breast cancer screening, high risk patient    Breast cancer screening, high risk patient        Anthropometrics:  Ht Readings from Last 1 Encounters:   02/20/24 1.575 m (5' 2\")     BMI Readings from Last 1 Encounters:   04/15/24 29.45 kg/m²     Wt Readings from Last 10 Encounters:   04/15/24 73 kg (161 lb)   02/12/24 70.3 kg (155 lb)   01/23/24 70.3 kg (154 lb 15.7 oz)   01/23/24 69.9 kg (154 lb 3.2 oz)   01/18/24 70.3 kg (155 lb)   12/14/23 69.9 kg (154 lb)   01/19/23 66.2 kg (146 lb)   12/22/22 65.3 kg (144 lb)   12/22/22 65.3 kg (144 lb)   06/09/22 71.7 kg (158 lb)     Weight Change: Wt gain of 6 lbs (4%)  x 2 months   Not clinically significant     Estimated Energy Needs:    Total Energy Estimated Needs (kCal): 1400 kCal   Method for Estimating Needs: MSJ 1146 x 1.2   Total Protein Estimated Needs (g): 56 g   Total Protein Estimated Needs (g/kg): 0.8 g/kg    Nutrition Diagnosis     Patient has Malnutrition Diagnosis: No     Patient has Nutrition Diagnosis: Yes     Diagnosis Status (1): Ongoing  Nutrition Diagnosis 1: Food and nutrition related knowledge deficit Related to (1): no prior education on low FODMAP diet As Evidenced by (1): patient's desire to learn     Diagnosis Status (2): Onoging  Nutrition Diagnosis 2: Altered GI function  Nutrition Diagnosis 2: Altered GI function Related to (2): IBS As Evidenced by (2): patient reports diarrhea, bloating, excessive gas, abdominal " "pain                Nutrition Interventions/Recommendations     Nutrition Education:   1) Reviewed two long term goals of good symptom management AND a diverse, nutritious diet.     2) Discussed importance of consuming fiber containing foods in the long run for good overall health.     3) Provided instructions for cautious reintroduction of FODMAP containing foods. Discussed the following:  -  The \"Reintroduction Phase\" will last about 6 weeks. During this time, you will continue to follow a low FODMAP diet. One FODMAP group will be reintroduced each week.  - The FODMAP group that is being tested should be withdrawn from the diet before moving on to the next FODMAP group.   - A sample Reintroduction Schedule has been provided along with a blank sheet. On the blank sheet, fill in what FODMAP group was reintroduced that week. For each day, fill in the specific food and amount consumed. Record any GI symptoms experienced.   - If a FODMAP group is a known trigger, such as lactose, you can skip that group.  However, you may want to at least try a minimal amount of the food(s) to learn what level of tolerance that you have to those foods.   - Keep in mind that you may have GI symptoms throughout this process. We are exchanging having some GI symptoms for the information. If you experience symptoms, then you can pull back from eating these foods before the symptoms become too problematic.   - Schedule a follow-up visit in 6 weeks so we can discuss the results of the reintroduction.   - As a reminder, the goal is that this is a temporary process to learn what foods are triggering GI symptoms, and a low FODMAP diet should not be followed long-term.     *Patient expressed understanding of the nutrition education and denied any additional questions/concerns.     Educational Handouts: FODMAP Elimination Diet Overview by Anais Vang    Nutrition Monitoring and Evaluation   Estimated PO intake > 75% estimated energy needs - met, " ongoing   Improvement in patient's self-reported GI symptoms - met, ongoing   Reintroduction of FODMAP foods       Readiness to Change : Excellent  Level of Understanding : Excellent  Anticipated Compliant : Excellent     Follow-up: 6 weeks

## 2024-04-24 ENCOUNTER — NUTRITION (OUTPATIENT)
Dept: PRIMARY CARE | Facility: CLINIC | Age: 79
End: 2024-04-24
Payer: MEDICARE

## 2024-04-24 DIAGNOSIS — R14.0 ABDOMINAL BLOATING: ICD-10-CM

## 2024-04-24 DIAGNOSIS — K76.0 FATTY LIVER: ICD-10-CM

## 2024-04-24 DIAGNOSIS — Z71.3 DIETARY COUNSELING AND SURVEILLANCE: Primary | ICD-10-CM

## 2024-04-24 DIAGNOSIS — K58.0 IRRITABLE BOWEL SYNDROME WITH DIARRHEA: ICD-10-CM

## 2024-04-24 PROCEDURE — 97803 MED NUTRITION INDIV SUBSEQ: CPT

## 2024-04-26 DIAGNOSIS — K76.0 FATTY LIVER: Primary | ICD-10-CM

## 2024-04-29 DIAGNOSIS — M81.0 OSTEOPOROSIS, UNSPECIFIED OSTEOPOROSIS TYPE, UNSPECIFIED PATHOLOGICAL FRACTURE PRESENCE: ICD-10-CM

## 2024-04-30 ENCOUNTER — LAB (OUTPATIENT)
Dept: LAB | Facility: LAB | Age: 79
End: 2024-04-30
Payer: MEDICARE

## 2024-04-30 DIAGNOSIS — Z11.59 ENCOUNTER FOR HEPATITIS C SCREENING TEST FOR LOW RISK PATIENT: ICD-10-CM

## 2024-04-30 DIAGNOSIS — M81.0 OSTEOPOROSIS, UNSPECIFIED OSTEOPOROSIS TYPE, UNSPECIFIED PATHOLOGICAL FRACTURE PRESENCE: ICD-10-CM

## 2024-04-30 LAB
25(OH)D3 SERPL-MCNC: 29 NG/ML (ref 30–100)
ALBUMIN SERPL BCP-MCNC: 4.1 G/DL (ref 3.4–5)
ALP SERPL-CCNC: 63 U/L (ref 33–136)
ALT SERPL W P-5'-P-CCNC: 21 U/L (ref 7–45)
ANION GAP SERPL CALC-SCNC: 14 MMOL/L (ref 10–20)
AST SERPL W P-5'-P-CCNC: 20 U/L (ref 9–39)
BILIRUB SERPL-MCNC: 0.4 MG/DL (ref 0–1.2)
BUN SERPL-MCNC: 17 MG/DL (ref 6–23)
CALCIUM SERPL-MCNC: 9.5 MG/DL (ref 8.6–10.6)
CHLORIDE SERPL-SCNC: 102 MMOL/L (ref 98–107)
CO2 SERPL-SCNC: 26 MMOL/L (ref 21–32)
CREAT SERPL-MCNC: 0.83 MG/DL (ref 0.5–1.05)
EGFRCR SERPLBLD CKD-EPI 2021: 73 ML/MIN/1.73M*2
ERYTHROCYTE [DISTWIDTH] IN BLOOD BY AUTOMATED COUNT: 13.9 % (ref 11.5–14.5)
GLUCOSE SERPL-MCNC: 96 MG/DL (ref 74–99)
HCT VFR BLD AUTO: 38.9 % (ref 36–46)
HCV AB SER QL: NONREACTIVE
HGB BLD-MCNC: 12.5 G/DL (ref 12–16)
MCH RBC QN AUTO: 29.7 PG (ref 26–34)
MCHC RBC AUTO-ENTMCNC: 32.1 G/DL (ref 32–36)
MCV RBC AUTO: 92 FL (ref 80–100)
NRBC BLD-RTO: 0 /100 WBCS (ref 0–0)
PLATELET # BLD AUTO: 252 X10*3/UL (ref 150–450)
POTASSIUM SERPL-SCNC: 4.4 MMOL/L (ref 3.5–5.3)
PROT SERPL-MCNC: 6.6 G/DL (ref 6.4–8.2)
RBC # BLD AUTO: 4.21 X10*6/UL (ref 4–5.2)
SODIUM SERPL-SCNC: 138 MMOL/L (ref 136–145)
WBC # BLD AUTO: 8.4 X10*3/UL (ref 4.4–11.3)

## 2024-04-30 PROCEDURE — 85027 COMPLETE CBC AUTOMATED: CPT

## 2024-04-30 PROCEDURE — 82306 VITAMIN D 25 HYDROXY: CPT

## 2024-04-30 PROCEDURE — 80053 COMPREHEN METABOLIC PANEL: CPT

## 2024-04-30 PROCEDURE — 36415 COLL VENOUS BLD VENIPUNCTURE: CPT

## 2024-04-30 PROCEDURE — 86803 HEPATITIS C AB TEST: CPT

## 2024-05-01 ENCOUNTER — OFFICE VISIT (OUTPATIENT)
Dept: OTOLARYNGOLOGY | Facility: CLINIC | Age: 79
End: 2024-05-01
Payer: MEDICARE

## 2024-05-01 ENCOUNTER — CLINICAL SUPPORT (OUTPATIENT)
Dept: AUDIOLOGY | Facility: CLINIC | Age: 79
End: 2024-05-01
Payer: MEDICARE

## 2024-05-01 VITALS — BODY MASS INDEX: 29.08 KG/M2 | HEIGHT: 62 IN | WEIGHT: 158 LBS

## 2024-05-01 DIAGNOSIS — H61.21 IMPACTED CERUMEN OF RIGHT EAR: Primary | ICD-10-CM

## 2024-05-01 DIAGNOSIS — H90.A22 SENSORINEURAL HEARING LOSS (SNHL) OF LEFT EAR WITH RESTRICTED HEARING OF RIGHT EAR: ICD-10-CM

## 2024-05-01 DIAGNOSIS — H90.A31 MIXED CONDUCTIVE AND SENSORINEURAL HEARING LOSS OF RIGHT EAR WITH RESTRICTED HEARING OF LEFT EAR: ICD-10-CM

## 2024-05-01 DIAGNOSIS — H92.01 RIGHT EAR PAIN: ICD-10-CM

## 2024-05-01 DIAGNOSIS — H90.A22 SENSORINEURAL HEARING LOSS (SNHL) OF LEFT EAR WITH RESTRICTED HEARING OF RIGHT EAR: Primary | ICD-10-CM

## 2024-05-01 PROCEDURE — 99203 OFFICE O/P NEW LOW 30 MIN: CPT | Performed by: NURSE PRACTITIONER

## 2024-05-01 PROCEDURE — 1159F MED LIST DOCD IN RCRD: CPT | Performed by: NURSE PRACTITIONER

## 2024-05-01 PROCEDURE — 92567 TYMPANOMETRY: CPT

## 2024-05-01 PROCEDURE — 1036F TOBACCO NON-USER: CPT | Performed by: NURSE PRACTITIONER

## 2024-05-01 PROCEDURE — 92557 COMPREHENSIVE HEARING TEST: CPT

## 2024-05-01 PROCEDURE — 1160F RVW MEDS BY RX/DR IN RCRD: CPT | Performed by: NURSE PRACTITIONER

## 2024-05-01 ASSESSMENT — PATIENT HEALTH QUESTIONNAIRE - PHQ9
SUM OF ALL RESPONSES TO PHQ9 QUESTIONS 1 AND 2: 0
2. FEELING DOWN, DEPRESSED OR HOPELESS: NOT AT ALL
1. LITTLE INTEREST OR PLEASURE IN DOING THINGS: NOT AT ALL

## 2024-05-01 ASSESSMENT — PAIN - FUNCTIONAL ASSESSMENT: PAIN_FUNCTIONAL_ASSESSMENT: 0-10

## 2024-05-01 ASSESSMENT — PAIN SCALES - GENERAL: PAINLEVEL_OUTOF10: 0 - NO PAIN

## 2024-05-01 NOTE — PROGRESS NOTES
Subjective   Patient ID: Juan Alberto Alcantara is a 78 y.o. female who presents for Follow-up and Hearing Loss.  Hearing Loss      This patient is referred for evaluation of progressive hearing loss. The patient is not accompanied by anyone.   When asked about ear pain, hearing loss, discharge from ear, tinnitus, dizziness or vertigo, the patient admits to right greater than left hearing loss, right otalgia.  She also reports 2 incidents of small amount of bleeding from the right ear canal.  The approximate duration of the hearing loss is many years.  Patient reports having a stapedectomy in the 1990s and has worn hearing aids for years.  She reports that she has not had follow-up on her hearing loss since the start of the pandemic.   When asked about a significant past otological history including history of prior ear surgery, noise exposure, exposure to ototoxic drugs or agents, and/or family history of hearing loss, the patient admits to right stapedectomy.  Review of Systems   HENT:  Positive for hearing loss.      A comprehensive or 10 points review of the patient´s constitutional, neurological, HEENT, pulmonary, cardiovascular and genito-urinary systems showed only those mentioned in history of present illness.    Objective   Physical Exam  Constitutional: no fever, chills, weight loss or weight gain   General appearance: Appears well, well-nourished, well groomed. No acute distress.   Communication: Normal communication   Psychiatric: Oriented to person, place and time. Normal mood and affect.   Neurologic: Cranial nerves II-XII grossly intact and symmetric bilaterally.   Head and Face:   Head: Atraumatic with no masses, lesions or scarring.   Face: Normal symmetry, no paralysis, synkinesis or facial tic. No scars or deformities.     Eyes: Conjunctiva not edematous or erythematous   Ears: External inspection of ears with no deformity, scars or masses.  Right canal with cerumen impaction.  Left canal clear.  TM  intact.  No effusion or retraction noted.     Neck: Normal appearing, symmetric, trachea midline.   Cardiovascular: Examination of peripheral vascular system shows no clubbing or cyanosis.   Respiratory: No respiratory distress increased work of breathing. Inspection of the chest with symmetric chest expansion and normal respiratory effort.   Skin: No rashes in the head or neck  My interpretation of the audiogram done today is right-sided with moderate mixed hearing loss with excellent word recognition score and normal tympanogram.  Left side with mild sloping to moderate sensorineural hearing loss with excellent word recognition score and normal tympanogram.  Assessment/Plan     This patient presents for initial evaluation of acute acquired right-sided cerumen impaction and right-sided otalgia as well as chronic right mixed hearing loss and left sensorineural hearing loss.    Reassurance given that otologic exam is normal after cleaning with the exception of dry skin to both ear canals.  I did not see any source of bleeding in the right ear canal, but there was a small amount of dried blood adherent to her cerumen.  We discussed that the hard cerumen was likely the cause of her otalgia.  It was very hard and dry.  If this scratched her ear canal she may have had some bleeding.  I recommended using oil-based drops twice per month at bedtime to prevent dryness.  Patient will be scheduled with audiology for a hearing aid visit to reprogram her hearing aids based on today's audiogram.  She may follow-up with me as needed.  All questions were answered to patient's satisfaction.    This note was created using speech recognition transcription software. Despite proofreading, several typographical errors might be present that might affect the meaning of the content. Please call with any questions.  Patient ID: Juan Alberto Alcantara is a 78 y.o. female.    Ear cerumen removal    Date/Time: 5/1/2024 4:09 PM    Performed by: Rossy  GERALD Cunningham  Authorized by: GERALD Lara    Consent:     Consent obtained:  Verbal    Consent given by:  Patient    Risks discussed:  Pain    Alternatives discussed:  No treatment  Procedure details:     Location:  R ear    Procedure type comment:  Right angle hook and suction    Procedure outcomes: cerumen removed    Post-procedure details:     Inspection:  No bleeding, ear canal clear and TM intact    Hearing quality:  Improved    Procedure completion:  Tolerated well, no immediate complications         GERALD Lara 05/01/24 4:03 PM

## 2024-05-01 NOTE — RESULT ENCOUNTER NOTE
A  [employer] message was sent to patient regarding the results and recommendations as follows:    Dear Ms. Alcantara,    I am writing you to inform you that your hepatitis C test came back negative. If you have any questions, please do not hesitate to contact me at [phone] or by replying to this message.      Sincerely,    Adriano Rollins MD, TANVI  Chief Medical   [facility]  Associate Chief and Director of Clinical Operations  Division of Gastroenterology and Liver Disease   Master Clinician  [facility]  Professor of Medicine  [facility]

## 2024-05-03 NOTE — PROGRESS NOTES
AUDIOLOGIC EVALUATION      Name:  Juan Alberto Alcantara  :  1945  Age:  78 y.o.  Date of Evaluation:  2024    Time: 0060-2279    HISTORY:  Juan Alberto Alcantara, 78 y.o., was seen for an audiologic assessment prior to ENT evaluation. She  reported a known bilateral hearing loss, right ear more severe. She has been wearing bilateral hearing aids for the last 13 years. She has a history of right otosclerosis and had surgical intervention in the . She has long-standing bilateral tinnitus. She noted that within the last 6 months she's had occasional right aural fullness. She also noted an incidence of bloody otorrhea 1-2 months ago from her right ear. She has a history of bilateral PE tubes. She has a history of excessive recreational noise exposure. She denied otalgia, dizziness, and recent falls. She would like to establish hearing aid care with [facility].       RESULTS:  Otoscopic Evaluation:  Right Ear: Non-occluding cerumen  Left Ear: Non-occluding cerumen    Immittance Measures:  Right Ear: Type A -- indicating normal volume, pressure, and static compliance  Left Ear: Type A -- indicating normal volume, pressure, and static compliance    Acoustic Reflexes:  Right Ear: Responses absent 500-4000 Hz.   Left Ear: Responses absent 500, 2000, and 4000 Hz, present at 1000 Hz.    Distortion Product Otoacoustic Emissions:   Right Ear: Did not test.  Left Ear: Did not test.    Pure Tone Audiometry:    Right Ear: Mild sloping to severe mixed hearing loss  Left Ear: Hearing within normal limits sloping to a moderate sensorineural hearing loss    Asymmetry: Yes, right ear poorer    No previous audiogram to compare to.     Reliability:   Good    Speech Audiometry:   Right: Speech Reception Threshold (SRT) was obtained at 60 dBHL.   SRT/PTA in Good agreement with pure tone average.    Left: Speech Reception Threshold (SRT) was obtained at 40 dBHL.   SRT/PTA in Good agreement with pure tone average.    Word  Recognition Scores (WRS):  Right Ear: excellent (96%) in quiet when words were presented at 85 dBHL w/ masking.   Left Ear: excellent (100%) in quiet when words were presented at 80 dBHL.     Asymmetry: No      IMPRESSIONS:  Today's testing revealed normal ear canal volume, middle ear pressure, and tympanic membrane compliance in both ears. She has a hearing loss in both ears (right ear poorer) with excellent word recognition scores. She should continue utilizing binaural amplification. The results were discussed with the patient.     She can schedule a hearing aid check with our office to establish hearing aid care. She was previously seen in [place], [place]. She moved and then established care with a Dr. Dickens at [place].       RECOMMENDATIONS:  1.) Continue medical follow up with Ears Nose and Throat (ENT) provider as recommended.  2.) Return for audiologic evaluation in conjunction with medical management or annually (whichever is sooner) to monitor hearing sensitivity and assess middle ear status or sooner should concerns arise.  3.) Continue full-time use of amplification.  4.) Schedule a hearing aid check to establish care with our office at [phone].      Shira Goldsmith, CCC-A  Clinical Audiologist          KEY  SNHL Sensorineural Hearing Loss   CHL Conductive Hearing Loss   MHL Mixed Hearing Loss   SSNHL Sudden Sensorineural Hearing Loss   WNL Within Normal Limits   PTA Pure Tone Average   TM Tympanic Membrane   ECV Ear Canal Volume   SRT Speech Reception Threshold   WRS Word Recognition Score

## 2024-05-06 ENCOUNTER — HOSPITAL ENCOUNTER (OUTPATIENT)
Dept: RADIOLOGY | Facility: CLINIC | Age: 79
Discharge: HOME | End: 2024-05-06
Payer: MEDICARE

## 2024-05-06 ENCOUNTER — APPOINTMENT (OUTPATIENT)
Dept: RADIOLOGY | Facility: CLINIC | Age: 79
End: 2024-05-06
Payer: MEDICARE

## 2024-05-06 DIAGNOSIS — K76.0 FATTY LIVER: ICD-10-CM

## 2024-05-06 PROCEDURE — 76705 ECHO EXAM OF ABDOMEN: CPT

## 2024-05-06 PROCEDURE — 76705 ECHO EXAM OF ABDOMEN: CPT | Performed by: STUDENT IN AN ORGANIZED HEALTH CARE EDUCATION/TRAINING PROGRAM

## 2024-05-07 NOTE — RESULT ENCOUNTER NOTE
Results reviewed.  Liver ultrasound shows mild fatty deposits in the liver  Recommendations would be healthy diet weight loss  Would recommend vitamin E 400 international units every day  Please call if you have any questions or concerns.

## 2024-05-08 NOTE — RESULT ENCOUNTER NOTE
A  Meizu message was sent to patient regarding the results and recommendations as follows:    Dear Ms. Alcantara,    Your liver ultrasound shows mild fatty infiltration of the liver but there are no signs of scarring in your liver. As you recall, we recommended avoiding alcohol, and weight loss with diet and exercise.     You are scheduled for the Fibroscan on 5/28/24 at 10:00 AM. Those results will help us determine if there is any scarring (or fibrosis) in your liver.    If you have any questions regarding your ultrasound results, please do not hesitate to contact me by replying to this message or calling my office at [phone].      Sincerely,    Adriano Rollins MD, TANVI  Chief Medical   [facility]  Associate Chief and Director of Clinical Operations  Division of Gastroenterology and Liver Disease   Master Clinician  [facility]  Professor of Medicine  [facility]

## 2024-05-28 ENCOUNTER — CLINICAL SUPPORT (OUTPATIENT)
Dept: GASTROENTEROLOGY | Facility: CLINIC | Age: 79
End: 2024-05-28
Payer: MEDICARE

## 2024-05-28 DIAGNOSIS — K76.0 FATTY LIVER: ICD-10-CM

## 2024-05-28 PROCEDURE — 91200 LIVER ELASTOGRAPHY: CPT | Performed by: INTERNAL MEDICINE

## 2024-05-28 NOTE — RESULT ENCOUNTER NOTE
A  ELENZA message was sent to patient regarding the results and recommendations as follows:    Dear Ms. Alcantara,    Your liver Fibroscan shows moderate fatty infiltration of the liver but there are no signs of any significant scarring in your liver. As you recall, we recommended avoiding alcohol and weight loss with diet and exercise.     If you have any questions regarding your Fibroscan results, please do not hesitate to contact me by replying to this message or calling my office at [phone].      Sincerely,    Adriano Rollins MD, TANVI  Chief Medical   [facility]  Associate Chief and Director of Clinical Operations  Division of Gastroenterology and Liver Disease   Master Clinician  [facility]  Professor of Medicine  [facility]

## 2024-06-05 ENCOUNTER — APPOINTMENT (OUTPATIENT)
Dept: PRIMARY CARE | Facility: CLINIC | Age: 79
End: 2024-06-05
Payer: MEDICARE

## 2024-06-06 ENCOUNTER — CLINICAL SUPPORT (OUTPATIENT)
Dept: AUDIOLOGY | Facility: CLINIC | Age: 79
End: 2024-06-06
Payer: MEDICARE

## 2024-06-06 DIAGNOSIS — H90.A22 SENSORINEURAL HEARING LOSS (SNHL) OF LEFT EAR WITH RESTRICTED HEARING OF RIGHT EAR: ICD-10-CM

## 2024-06-06 DIAGNOSIS — H90.A31 MIXED CONDUCTIVE AND SENSORINEURAL HEARING LOSS OF RIGHT EAR WITH RESTRICTED HEARING OF LEFT EAR: Primary | ICD-10-CM

## 2024-06-06 ASSESSMENT — PAIN - FUNCTIONAL ASSESSMENT: PAIN_FUNCTIONAL_ASSESSMENT: 0-10

## 2024-06-06 ASSESSMENT — PAIN SCALES - GENERAL: PAINLEVEL_OUTOF10: 0 - NO PAIN

## 2024-06-06 NOTE — PROGRESS NOTES
ADULT HEARING AID CHECK      Name:  Juan Alberto Alcantara   :  1945   Age:  78 y.o.   Date of Evaluation:  2024     Time: 9706-7112    RECOMMENDATIONS     Strive for full-time device use during waking hours, except when activities preclude device safety.  Return for follow-up hearing aid check appointment scheduled for 2024 at 1:00 with Shira Goldsmith CCC-A.  Will go over use of Com- for phone and television use.  Will ensure comfortable fit of domes and sport lock.   Will discuss smart phone application/cell phone compatibility.   Programming adjustments as needed. Consider use of Real Ear Measures.  Return for audiologic assessment in conjunction with otologic care or annually, whichever is sooner. Return sooner if concerns arise. The audiology department can be reached at [phone] to schedule an appointment.  Consider use of good communication strategies. These include but are not limited to the following: get Juan Alberto's attention before speaking to her, close the distance between Juan Alberto and who is speaking, limit background noise, allow Juan Alberto access to visual cues (i.e. facial expressions/mouth movements, pictures, written instructions, etc.). When in situations where background noise cannot be avoided, position yourself so that the background noise is to your back, and you communication partner is seated in front of you, ideally with a quiet area or wall behind them.   Continue medical follow up with primary care provider and/or Ears Nose and Throat (ENT) provider as recommended.  Avoid exposure to loud sounds by moving away from the noise, turning down the volume, or wearing proper hearing protection correctly.  Consider use of tinnitus management options, which include but are not limited to the following: sound therapy (use of pleasant or calming sounds that diminish the presence of tinnitus); mindfulness, meditation, and breathing exercises; sleep hygiene; mental health evaluation  and formal therapies; psychiatric management of psychopharmaceuticals; dental/orthodontia evaluation; dietary changes (reduction of sodium, caffeine, alcohol); lifestyle changes (exercise, etc.); use of hearing protection and avoidance of loud noise; and formal tinnitus therapies (Tinnitus Retraining Therapy/ TRT, Progressive Tinnitus Management, Tinnitus Activities Treatment, Cognitive Behavioral Therapy); and Biomedical Neuromodulation (Lenire).     HISTORY     Ms. Alcantara arrived for a hearing aid check. Today, she reports overall doing well with her devices; however, endorses the following they are old (fit seven years ago), and she wants to be ensured that they are functioning well. She reports right ear pain with the right hearing aid dome. Patient is not satisfied with the volume of the hearing aids, as she increases them by four clicks every morning when she puts them on.     Ms. Avendano's most recent hearing evaluation was performed on 2024, and indicated mild sloping to severe mixed hearing loss in the right ear and hearing sensitivity within normal limits sloping to moderate sensorineural hearing loss in the left ear, with good  word recognition in quiet bilaterally.    HEARING AID CHECK     Hearing Aid Information Right Left    Phonak Phonak   Model Audéo H39-188A Audéo A85-653S   Serial Number [id number]   /Tubing 0S 0S   Dome Cap  Small Closed   Retention Yes Yes   Wax Traps Cerustop Cerustop   Battery size 312 (brown) size 312 (brown)   Other Equipment Com-   * Hearing aids pair to [facility] via [employer].     Repair Warranty  (2020)   Loss and Damage Warranty  (2020)   Fitting Date    Fitting Audiologist Outside of      Paired to Phone for phone calls: Unknown   Paired to smart phone application: No; Hearing aids are not compatible (Patient has an iPhone)  Fitting formula: Adaptive Phonak Digital  Gain Level: 100%  Volume controls active:  "Yes  Feedback Manager Performed: Yes    Programs:  AutoSense  Phone via [employer] [facility]     PROGRAMMING, VERIFICATION, AND VALIDATION MEASURES     Otoscopy revealed clear ear canals with visible cones of light bilaterally.      Ms. Alcantara's hearing aids were cleaned and checked. Visual inspection and listening check revealed good working function and fair sound quality of both devices. Changed wax traps, domes, and retention lines. Listening check revealed improved sound quality following hearing aid cleaning.     The hearing aids were paired to the computer software. The following programming changes were made:   Reprogrammed to most recent audiogram performed on 5/1/2024.,   Target gain was increased from 80% to 100%   Overall gain was increased by 9 steps for the left ear, and 3 steps for the right ear in order to obtain adequate volume and equal loudness between ears.   After volume adjustments, patient reported that her voice was too loud, but was satisfied with the volume of others' voices. To address this, in automatic fine tuning, for own voice too loud, \"fixed issue.\"    Note, initial hearing aid settings were saved to Amandeep before adjustments were made.      The hearing aids were returned to patient who expressed satisfaction.     Patient will be billed for hearing aid check at follow-up appointment with Dr. Johnson.     PATIENT EDUCATION     Discussed results and recommendations with Ms. Alcantara. Questions were addressed and the patient was encouraged to contact our department at [phone] should concerns arise.       GULSHAN Waite, CCC-A  Licensed Clinical Audiologist     "

## 2024-06-17 ENCOUNTER — NUTRITION (OUTPATIENT)
Dept: GASTROENTEROLOGY | Facility: HOSPITAL | Age: 79
End: 2024-06-17
Payer: MEDICARE

## 2024-06-17 DIAGNOSIS — R73.03 PREDIABETES: ICD-10-CM

## 2024-06-17 DIAGNOSIS — R14.0 ABDOMINAL BLOATING: ICD-10-CM

## 2024-06-17 DIAGNOSIS — K76.0 FATTY LIVER: ICD-10-CM

## 2024-06-17 DIAGNOSIS — K58.0 IRRITABLE BOWEL SYNDROME WITH DIARRHEA: Primary | ICD-10-CM

## 2024-06-17 PROCEDURE — 97803 MED NUTRITION INDIV SUBSEQ: CPT

## 2024-06-17 NOTE — PROGRESS NOTES
Nutrition: Follow-up     Reason for Nutrition Visit: Patient is a 78 y.o. female referred for IBS-D, fatty liver, and abdominal bloating. Referred on 2/12/24 by Dr. Rollins.     Food and Nutrition Related History: Pt presents in office for visit #4. Has been avoiding garlic as she associates it with GI symptoms. Followed the reintroduction protocol. Concerned about weight gain.     Week 1: some gassiness with black beans and onions, more severe symptoms with increased amounts   Week: 2: low fat milk caused gassiness   Week 3: blackberries triggered diarrhea, this week was particularly symptomatic   Week 5: reaction with honey, corona was okay   Week 6: no reaction to regular pasta/bagels       Visit #3  Ms. Alcantara presents in office. Stressful month and had difficulty reintroducing foods. She did do some reintroduction, such as broccoli, grapes. Both seemed to give her GI symptoms. Mainly eating potatoes, noodles, bread and other easy to digest foods. Has really limited seasonings. Understands the importance of reintroducing, and she is willing to try these next few weeks.     Visit #2  Has noticed significantly less diarrhea (has noticed 60-70% improvement), abdominal pain (has noticed 60% improvement), gassiness (has noticed 30-40% improvement). Still feels bloating.     Visit #1  Pt presents for nutrition counseling. Has significant IBS, predominantly diarrhea. Ongoing for years. Has abdominal pain, excessive gas (in the evening), bloating. Most of her life, she struggled with constipation. In more recent years, diarrhea is predominate symptom. Has loose, watery BM 4-5x per day. Sometimes needs to go within 10-15 minutes after eating and other times it seems to be more erratic. Does not eat meat. Does eat poultry and fish. Followed a vegetarian diet briefly and developed anemia. Going to bed at 1-3am. Feels very hungry in the later evening hours. Lives alone and enjoys eating out. Has been taking Imodium as  "needed.     ----------------------  Food Allergy: none  Food Intolerance: sugar alcohols, some degree of lactose intolerance (especially with ice cream)  GI Symptoms: bloating, nausea, diarrhea, constipation, increased gas, abdominal pain GI Symptoms greater than 2 weeks: intermittent  Oral Problems: denies  Dentition: own  Cooking: Patient  Grocery Shopping: Patient  Alcohol: red wine 1-2 glasses daily  Dietary Supplements: Centrum Silver (tries to take it with crackers in the morning),   Food Insecurity: Denies    Physical Activity: mostly sedentary    Labs:  Comments: Low vitamin D = 29 (4/30/24). CMP: eGFR = 73 (4/30/24). Last lipid panel = normal (12/22/22). A1c = 5.7% suggestive of pre-DM (12/22/22).       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    Hyperlipidemia    Keratoconjunctivitis sicca of both eyes not specified as Sjogren's    Major depressive disorder, single episode, unspecified    Mixed conductive and sensorineural hearing loss of right ear with restricted hearing of left ear    Nasal vestibulitis    Nuclear senile cataract of both eyes    Obesity, Class I, BMI 30-34.9    Otosclerosis involving oval window, nonobliterative    Posterior vitreous detachment of both eyes    Presbyopia    Recurrent major depressive disorder, in partial remission (CMS/HCC)    Recurrent UTI    Senile osteoporosis    Skin cancer    Prediabetes    Osteoporosis    Overweight with body mass index (BMI) 25.0-29.9    Breast cancer screening, high risk patient    Breast cancer screening, high risk patient        Anthropometrics:  Ht Readings from Last 1 Encounters:   02/20/24 1.575 m (5' 2\")     BMI " Readings from Last 1 Encounters:   05/01/24 28.90 kg/m²     Wt Readings from Last 10 Encounters:   05/01/24 71.7 kg (158 lb)   04/15/24 73 kg (161 lb)   02/12/24 70.3 kg (155 lb)   01/23/24 70.3 kg (154 lb 15.7 oz)   01/23/24 69.9 kg (154 lb 3.2 oz)   01/18/24 70.3 kg (155 lb)   12/14/23 69.9 kg (154 lb)   01/19/23 66.2 kg (146 lb)   12/22/22 65.3 kg (144 lb)   12/22/22 65.3 kg (144 lb)     No clinically significant wt loss     Estimated Energy Needs:    Total Energy Estimated Needs (kCal): 1400 kCal   Method for Estimating Needs: MSJ 1146 x 1.2   Total Protein Estimated Needs (g): 56 g   Total Protein Estimated Needs (g/kg): 0.8 g/kg    Nutrition Diagnosis     Patient has Malnutrition Diagnosis: No     Patient has Nutrition Diagnosis: Yes     Diagnosis Status (1): Ongoing  Nutrition Diagnosis 1: Food and nutrition related knowledge deficit Related to (1): no prior education on low FODMAP diet As Evidenced by (1): patient's desire to learn     Diagnosis Status (2): Onoging  Nutrition Diagnosis 2: Altered GI function  Nutrition Diagnosis 2: Altered GI function Related to (2): IBS As Evidenced by (2): patient reports diarrhea, bloating, excessive gas, abdominal pain                Nutrition Interventions/Recommendations     Nutrition Education:   Recommend continuing to avoid the foods that seemed to trigger the most severe GI symptoms (blackberries, honey, beans); however, start to include more diversity in the diet.   Aim to incorporate fruit in small portions with the exception of blackberries.   Aim to incorporate vegetables in small portions, especially cooked vegetables.  Pick 1-2 recipes per week and form a grocery list. Keep it simple and start to reincorporate new foods! Try using diabetesfoodhub.org for recipe ideas.   We discussed some breakfast ideas:  1-2 slices toast with avocado, 1 egg, ½ cup blueberries  Chicken quesadilla  Apple oatmeal custard - see recipe    Nutrition Monitoring and Evaluation    Estimated PO intake > 75% estimated energy needs - met, ongoing   Improvement in patient's self-reported GI symptoms - met, ongoing   Reintroduction of FODMAP foods - met, ongoing       Readiness to Change : Excellent  Level of Understanding : Excellent  Anticipated Compliant : Excellent     Follow-up:  8 weeks

## 2024-06-24 ENCOUNTER — APPOINTMENT (OUTPATIENT)
Dept: PRIMARY CARE | Facility: CLINIC | Age: 79
End: 2024-06-24
Payer: MEDICARE

## 2024-06-24 ENCOUNTER — APPOINTMENT (OUTPATIENT)
Dept: GASTROENTEROLOGY | Facility: HOSPITAL | Age: 79
End: 2024-06-24
Payer: MEDICARE

## 2024-06-24 VITALS
TEMPERATURE: 98.2 F | SYSTOLIC BLOOD PRESSURE: 130 MMHG | DIASTOLIC BLOOD PRESSURE: 70 MMHG | RESPIRATION RATE: 16 BRPM | BODY MASS INDEX: 30.03 KG/M2 | HEART RATE: 64 BPM | WEIGHT: 164.2 LBS

## 2024-06-24 DIAGNOSIS — E78.49 OTHER HYPERLIPIDEMIA: Primary | ICD-10-CM

## 2024-06-24 DIAGNOSIS — Z00.00 HEALTH CARE MAINTENANCE: ICD-10-CM

## 2024-06-24 DIAGNOSIS — M81.0 OSTEOPOROSIS WITHOUT CURRENT PATHOLOGICAL FRACTURE, UNSPECIFIED OSTEOPOROSIS TYPE: ICD-10-CM

## 2024-06-24 DIAGNOSIS — G25.0 BENIGN ESSENTIAL TREMOR: ICD-10-CM

## 2024-06-24 PROBLEM — H91.93 BILATERAL HEARING LOSS: Status: ACTIVE | Noted: 2024-06-24

## 2024-06-24 PROBLEM — J30.1 ALLERGIC RHINITIS DUE TO POLLEN: Status: ACTIVE | Noted: 2024-06-24

## 2024-06-24 PROBLEM — M62.89 PELVIC FLOOR DYSFUNCTION IN FEMALE: Status: ACTIVE | Noted: 2024-02-12

## 2024-06-24 PROBLEM — R20.0 NUMBNESS: Status: ACTIVE | Noted: 2024-06-24

## 2024-06-24 PROBLEM — R12 HEARTBURN: Status: ACTIVE | Noted: 2024-02-12

## 2024-06-24 PROBLEM — H02.889 MGD (MEIBOMIAN GLAND DYSFUNCTION): Status: ACTIVE | Noted: 2024-05-17

## 2024-06-24 PROBLEM — K58.0 IRRITABLE BOWEL SYNDROME WITH DIARRHEA: Status: ACTIVE | Noted: 2024-02-12

## 2024-06-24 PROBLEM — D13.0 LEIOMYOMA OF ESOPHAGUS: Status: ACTIVE | Noted: 2024-02-12

## 2024-06-24 PROBLEM — H00.029 HORDEOLUM INTERNUM: Status: ACTIVE | Noted: 2022-05-17

## 2024-06-24 PROBLEM — Z96.1 PSEUDOPHAKIA OF BOTH EYES: Status: ACTIVE | Noted: 2022-05-17

## 2024-06-24 PROBLEM — Z85.828 HISTORY OF SQUAMOUS CELL CARCINOMA OF SKIN: Status: ACTIVE | Noted: 2023-10-06

## 2024-06-24 PROBLEM — H92.01 RIGHT EAR PAIN: Status: ACTIVE | Noted: 2024-06-24

## 2024-06-24 PROBLEM — H61.21 IMPACTED CERUMEN OF RIGHT EAR: Status: ACTIVE | Noted: 2024-06-24

## 2024-06-24 PROBLEM — R31.9 HEMATURIA: Status: ACTIVE | Noted: 2024-06-24

## 2024-06-24 PROBLEM — L56.8 PHOTOSENSITIVITY DERMATITIS: Status: ACTIVE | Noted: 2024-06-24

## 2024-06-24 PROBLEM — R14.0 ABDOMINAL BLOATING: Status: ACTIVE | Noted: 2024-02-12

## 2024-06-24 PROCEDURE — 1036F TOBACCO NON-USER: CPT | Performed by: INTERNAL MEDICINE

## 2024-06-24 PROCEDURE — 1159F MED LIST DOCD IN RCRD: CPT | Performed by: INTERNAL MEDICINE

## 2024-06-24 PROCEDURE — 99214 OFFICE O/P EST MOD 30 MIN: CPT | Performed by: INTERNAL MEDICINE

## 2024-06-24 RX ORDER — PROPRANOLOL HYDROCHLORIDE 20 MG/1
20 TABLET ORAL 2 TIMES DAILY
Qty: 60 TABLET | Refills: 5 | Status: SHIPPED | OUTPATIENT
Start: 2024-06-24 | End: 2024-12-21

## 2024-06-24 RX ORDER — ROSUVASTATIN CALCIUM 20 MG/1
20 TABLET, COATED ORAL DAILY
Qty: 90 TABLET | Refills: 3 | Status: SHIPPED | OUTPATIENT
Start: 2024-06-24

## 2024-06-24 RX ORDER — FLUOXETINE HYDROCHLORIDE 40 MG/1
40 CAPSULE ORAL DAILY
Qty: 90 CAPSULE | Refills: 3 | Status: SHIPPED | OUTPATIENT
Start: 2024-06-24

## 2024-06-24 NOTE — PROGRESS NOTES
Juan Alberto Alcantara is a 78 y.o. female   Patient with a past medical history of osteoporosis on Prolia (DEXA 2024), Breast Ca followed by breast team, HLD, controlled anxiety/depression, recurrent UTI, Asthma, CHAMORRO, Mammogram due, Colonoscopy is up to date, stress incontinence, essential tremors, IBS (diarrhea)      Patient comes in for a routine check  Asthma is stable    Tremors are better with PPNL      No chest pain/  SOB/ dizziness  BM OK  Energy level ok  Appetite OK             Review of Systems     Constitutional: not feeling poorly, no fever, no recent weight gain and no recent weight loss.   Eyes: no blurred vision and no diplopia.   ENT: Hearing loss  Cardiovascular: no chest pain, no tightness or heavy pressure, no shortness of breath, no palpitations and no lower extremity edema.   Respiratory: Asthma  Gastrointestinal: no change in bowel habits, no constipation, no bloody stools, no nausea, no vomiting, no abdominal pain, no signs and symptoms of ulcer disease, no ari colored stools and no intolerance to fatty foods.   Genitourinary: Frequent UTIs  Musculoskeletal: no arthralgias, no joint stiffness, no muscle weakness, no back pain and no difficulty walking.   Skin: no rashes, no change in skin color and pigmentation, no skin lesions and no skin lumps.   Neurological: no headaches, no dizziness, no seizures, no tingling, no numbness, no signs and symptoms of stroke and no limb weakness.   Psychiatric: no confusion, no memory lapses or loss, no depression and no sleep disturbances.   Endocrine: no goiter, no thyroid disorder, no diabetes mellitus, no excessive thirst, no dry skin, no cold intolerance, no heat intolerance and no increased urinary frequency.   Hematologic/Lymphatic: is not slow to heal, does not bleed easily, does not bruise easily, no thrombophlebitis, no anemia and no history of blood transfusion.   All other systems have been reviewed and are negative for complaint.     Vitals:     06/24/24 1217   BP: 130/70   Pulse: 64   Resp: 16   Temp: 36.8 °C (98.2 °F)        Physical Exam     Constitutional   General appearance: Alert and in no acute distress.   Eyes   Inspection of eyes: Sclera and conjunctiva were normal.    Pupil exam: Pupils were equal in size. Extraocular movements were intact.   Pulmonary   Respiratory assessment: No respiratory distress, normal respiratory rhythm and effort.    Auscultation of Lungs: Clear bilateral breath sounds.   Cardiovascular   Auscultation of heart: Apical pulse normal, heart rate and rhythm normal, normal S1 and S2, no murmurs and no pericardial rub.    Exam for edema: No peripheral edema.   Abdomen   Abdominal Exam: No bruits, normal bowel sounds, soft, non-tender, no abdominal mass palpated.    Liver and Spleen exam: No hepato-splenomegaly.   Musculoskeletal   Examination of gait: Normal.    Inspection of digits and nails: No clubbing or cyanosis of the fingernails.    Inspection/palpation of joints, bones and muscles: No joint swelling. Normal movement of all extremities.   Skin   Skin inspection: Normal skin color and pigmentation, normal skin turgor and no visible rash.   Neurologic   Cranial nerves: Nerves 2-12 were intact, no focal neuro defects.   Psychiatric   Orientation: Oriented to person, place, and time.    Mood and affect: Normal.       Assessment/Plan          Patient with a past medical history of osteoporosis on Prolia (DEXA 2024), Breast Ca followed by breast team, HLD, controlled anxiety/depression, recurrent UTI, Asthma, CHAMORRO, Mammogram due, Colonoscopy is up to date, stress incontinence, essential tremors, IBS (diarrhea)    #Essential tremors  Being helped with propranolol  Asthma has not affected  If it ever does then be will stop and try primidone    #Asthma  Allergy induced  Stable on Advair    #Dyslipidemia  Continue statins      #Osteoporosis  On Prolia  Scheduled for DEXA scan in the coming months    #Depression with  anxiety  Stable continue home medications    #History of breast carcinoma  Scheduled for mammogram through her gynecologist    #Acid reflux  Takes PPI on occasions

## 2024-06-26 ENCOUNTER — CLINICAL SUPPORT (OUTPATIENT)
Dept: AUDIOLOGY | Facility: CLINIC | Age: 79
End: 2024-06-26

## 2024-06-26 DIAGNOSIS — H90.A31 MIXED CONDUCTIVE AND SENSORINEURAL HEARING LOSS OF RIGHT EAR WITH RESTRICTED HEARING OF LEFT EAR: Primary | ICD-10-CM

## 2024-06-26 DIAGNOSIS — H90.A22 SENSORINEURAL HEARING LOSS (SNHL) OF LEFT EAR WITH RESTRICTED HEARING OF RIGHT EAR: ICD-10-CM

## 2024-06-26 PROCEDURE — V5299 HEARING SERVICE: HCPCS | Mod: AUDSP

## 2024-06-26 ASSESSMENT — PAIN SCALES - GENERAL: PAINLEVEL_OUTOF10: 0 - NO PAIN

## 2024-06-26 ASSESSMENT — PAIN - FUNCTIONAL ASSESSMENT: PAIN_FUNCTIONAL_ASSESSMENT: 0-10

## 2024-06-26 NOTE — PROGRESS NOTES
"ADULT HEARING AID CHECK      Name:  Juan Alberto Alcantara   :  1945   Age:  78 y.o.   Date of Evaluation:  2024     Time: 7988-7796    HISTORY:  Juan Alberto Alcantara is in for a hearing aid check. She had an audiologic assessment on 24 and noted she wanted to transfer hearing aid care to [facility] at that time. She has 7 year old devices that she bought at an outside facility. She was initially seen by Dr. Dora Cross for a hearing aid check on 24. At that time she made significant program adjustments. Overall, Juan Alberto reported hearing \"100%\" better. She hears more extraneous sounds. She also feels that the right hearing aid is not staying in her ear. She would also like to discuss hearing aid placement on her ear.       HEARING AIDS:     Hearing Aid Information Right Left    Phonak Phonak   Model Audéo W72-022B Audéo P19-647B   Serial Number [id number]   /Tubing 0S 0S   Dome Cap  Small Closed   Retention Yes Yes   Wax Traps Cerustop Cerustop   Battery size 312 (brown) size 312 (brown)   Other Equipment Com-   * Hearing aids pair to [facility] via [employer].      Repair Warranty  (2020)   Loss and Damage Warranty  (2020)   Fitting Date    Fitting Audiologist Outside of       Paired to Phone for phone calls: No  Paired to smart phone application: No;   Fitting formula: Adaptive Phonak Digital  Gain Level: 100%  Volume controls active: Yes  Feedback Manager Performed: Yes     Programs:  [employer]  Phone via [employer] + singh       IMPRESSIONS:  Visual inspection revealed appropriate wire length for both ears. We reviewed that the devices should sit close to the top of the ears. There were no indentations on the skin from the wires. She noted that she would still like to change the wire length on her right ear to a larger length, in hopes that it will aid in retention.     I noted that in that case I would have to order a new speaker and it costs " her $125. I recommended we switch to a small open marvel dome instead of a cap dome to aid in retention. She initially felt that it was too big in her ear. I encouraged her to try it. In the case that she cannot adjust, I showed her how to switch back to the cap dome.     We briefly discussed a custom earmold, but she does not want to invest more money into her older hearing aids. She may consider new devices later in the year.     We paired her to her TV streamer and com-, in-office.     She will follow-up with me regarding how the small open dome fits. She is scheduled for a 6 month hearing aid check. We also reviewed appointment pricing. She is in good agreement.     She paid for a hearing aid check at checkout.      RECOMMENDATIONS:  1.) Follow-up for a hearing aid check in 6 months.   2.) Continue full-time use of amplification.       Shira Goldsmith, CCC-A  Clinical Audiologist

## 2024-07-01 ENCOUNTER — OFFICE VISIT (OUTPATIENT)
Dept: GASTROENTEROLOGY | Facility: HOSPITAL | Age: 79
End: 2024-07-01
Payer: MEDICARE

## 2024-07-01 ENCOUNTER — APPOINTMENT (OUTPATIENT)
Dept: GASTROENTEROLOGY | Facility: HOSPITAL | Age: 79
End: 2024-07-01
Payer: MEDICARE

## 2024-07-01 VITALS
BODY MASS INDEX: 29.76 KG/M2 | HEART RATE: 73 BPM | WEIGHT: 162.7 LBS | OXYGEN SATURATION: 94 % | DIASTOLIC BLOOD PRESSURE: 88 MMHG | SYSTOLIC BLOOD PRESSURE: 135 MMHG | TEMPERATURE: 97.5 F

## 2024-07-01 DIAGNOSIS — K58.0 IRRITABLE BOWEL SYNDROME WITH DIARRHEA: Primary | ICD-10-CM

## 2024-07-01 DIAGNOSIS — R12 HEARTBURN: ICD-10-CM

## 2024-07-01 DIAGNOSIS — D13.0 LEIOMYOMA OF ESOPHAGUS: ICD-10-CM

## 2024-07-01 DIAGNOSIS — R14.0 ABDOMINAL BLOATING: ICD-10-CM

## 2024-07-01 DIAGNOSIS — M62.89 PELVIC FLOOR DYSFUNCTION IN FEMALE: ICD-10-CM

## 2024-07-01 DIAGNOSIS — K76.0 FATTY LIVER: ICD-10-CM

## 2024-07-01 PROCEDURE — 1160F RVW MEDS BY RX/DR IN RCRD: CPT | Performed by: INTERNAL MEDICINE

## 2024-07-01 PROCEDURE — 1159F MED LIST DOCD IN RCRD: CPT | Performed by: INTERNAL MEDICINE

## 2024-07-01 PROCEDURE — 99215 OFFICE O/P EST HI 40 MIN: CPT | Performed by: INTERNAL MEDICINE

## 2024-07-01 PROCEDURE — 1036F TOBACCO NON-USER: CPT | Performed by: INTERNAL MEDICINE

## 2024-07-01 PROCEDURE — 1126F AMNT PAIN NOTED NONE PRSNT: CPT | Performed by: INTERNAL MEDICINE

## 2024-07-01 SDOH — ECONOMIC STABILITY: FOOD INSECURITY: WITHIN THE PAST 12 MONTHS, YOU WORRIED THAT YOUR FOOD WOULD RUN OUT BEFORE YOU GOT MONEY TO BUY MORE.: NEVER TRUE

## 2024-07-01 SDOH — ECONOMIC STABILITY: FOOD INSECURITY: WITHIN THE PAST 12 MONTHS, THE FOOD YOU BOUGHT JUST DIDN'T LAST AND YOU DIDN'T HAVE MONEY TO GET MORE.: NEVER TRUE

## 2024-07-01 ASSESSMENT — LIFESTYLE VARIABLES
HOW OFTEN DO YOU HAVE SIX OR MORE DRINKS ON ONE OCCASION: NEVER
AUDIT-C TOTAL SCORE: 0
SKIP TO QUESTIONS 9-10: 1
HOW OFTEN DO YOU HAVE A DRINK CONTAINING ALCOHOL: NEVER
HOW MANY STANDARD DRINKS CONTAINING ALCOHOL DO YOU HAVE ON A TYPICAL DAY: PATIENT DOES NOT DRINK

## 2024-07-01 ASSESSMENT — PATIENT HEALTH QUESTIONNAIRE - PHQ9
SUM OF ALL RESPONSES TO PHQ9 QUESTIONS 1 & 2: 0
1. LITTLE INTEREST OR PLEASURE IN DOING THINGS: NOT AT ALL
2. FEELING DOWN, DEPRESSED OR HOPELESS: NOT AT ALL

## 2024-07-01 ASSESSMENT — PAIN SCALES - GENERAL: PAINLEVEL: 0-NO PAIN

## 2024-07-01 NOTE — LETTER
July 1, 2024     Marko Baker MD  [address] [place] [address]   [address] [address]    Patient: Juan Alberto Alcantara   YOB: 1945   Date of Visit: 7/1/2024       Dear Dr. Marko Baker MD:    Thank you for referring Juan Alberto Alcantara to me for evaluation. Below are my notes for this consultation.  If you have questions, please do not hesitate to call me. I look forward to following your patient along with you.       Sincerely,     Adriano Rollins MD      CC: No Recipients  ______________________________________________________________________________________    Subjective   Patient ID:   Ms. Alcantara is a 78 year old woman with BMI = 30, history of squamous cell cancer and basal cell cancer of skin, hyperlipidemia, prediabetes (last HbA1c = 5.7% 12/22/22), asthma/COPD (former smoker), anxiety, depression (on fluoxetine), metabolic dysfunction-associated steatotic liver disease (MASLD), diarrhea-predominant irritable bowel syndrome, benign esophageal leiomyoma (diagnosed 2014), recurrent UTIs (on prophylactic Macrobid PRN), stress urinary incontinence, small benign R renal parapelvic cyst, essential tremor (on propranolol), osteoporosis, and DJD who is here for follow up re: diarrhea-predominant IBS, pelvic floor dysfunction, history of esophageal leiomyoma, and MASLD.    The patient last saw me 4/15/24 for follow up re: diarrhea-predominant IBS, pelvic floor dysfunction, history of esophageal leiomyoma, and MASLD. The patient initially saw me 2/12/24 per request of Dr. Marko Baker for a second opinion re: her IBS. See notes from those dates for full details. The patient has been previously followed by Dr. Wallace Deutsch - [facility].     At her last visit, I had previously recommended C difficile stool study given repeated antibiotic usage for recurrent UTIs. However, she had submitted a formed stool, so the lab was unable to test. She had not re-submitted a stool sample. She reported  that with the low FODMAP she has had significant improvement in her symptoms, and she was very pleased with the quality of her life at this time. She was reminded that a low FODMAP diet is not meant to be restrictive, but the point is to identify a particular food group that she is intolerant. She had her fourth follow up appointment with MsLibia Anisha - Nutrition on 6/17/24. Ms. Lancaster recommended follow up in 8 weeks. She was taking Metamucil gummies (instead of previously recommended Citrucel - see HPI), and she was taking them infrequently. She had more recent issues with constipation and straining with exacerbation of her external hemorrhoids. After discussion with her, she was going to try Optifiber ([employer] brand of wheat dextrin fiber supplement). I also discussed potential use with low dose amitriptyline, but the patient preferred to continue with the low FODMAP diet and initiation of fiber supplementation as noted above.    For her pelvic floor dysfunction with stress urinary incontinence and dysfunctional defecatory pattern with straining, I discussed options for further evaluation including anorectal manometry and MRI defecography with potential management including pelvic floor physical therapy. It was also recommended that she see a urogynecologist for additional evaluation and treatment options. After discussion, the patient wanted to hold off on further evaluation.    For her esophageal leiomyoma noted since 2014, she was told that the risk of developing malignancy is extremely low. She has had serial EGD and EUS procedures with her last one done 9/2022. Dr. Deutsch recommended no further surveillance given stability over the past 8 years, but the patient had requested a repeat EUS in 2 years (September 2024).     For her metabolic dysfunction-associated steatotic liver disease in setting of BMI = 28, hyperlipidemia, and prediabetes, it was recommended that she follow up with her PCP for a repeat HbA1c (last  one was done 12/22/22) and weight loss programs. She was also told to stop alcohol use (1 - 2 glasses red wine daily). I recommended additional evaluation including repeat liver ultrasound (given that it has been 10 years since her last ultrasound) and Fibroscan, depending on ultrasound results. She still has not had a HBA1c done. She had a RUQ ultrasound done 5/6/24 which revealed mild hepatic steatosis with normal gallbladder and bile ducts. A Fibroscan 5/28/24 revealed moderate hepatic steatosis with stage 0-1 fibrosis. She was informed of these results, and the previous recommendations regarding weight loss, healthy diet, exercise, and avoiding alcohol were reiterated with the patient.    For hepatitis C screening, which was reportedly done 4/20/16 at the [facility], the results were not in Epic Everywhere nor in the records from Dr. Deutsch's office, and she agreed to have this test done which was negative 4/30/24.    She also instructed to follow up with Dr. Baker for further evaluation and management, including follow-up HbA1c, for her chronic fatigue.    She reports that she feels 200% better in regards to her IBS-D and gaseous abdominal distention since starting the low FODMAP diet. She would like to continue follow up with Nutrition.  She has occasional days in which she eats certain foods that trigger symptoms. She will take Imodium PRN for loose stools, but she is using this less frequently then when she first started the low FODMAP diet.    She has had occasional transient burning sensation in her left breast/substernal chest area when food seems to pass this area. She has taken a PPI, possibly Protonix, which has helped in the past with it when used as a PRN medication.  She denies dysphagia, nausea, vomiting, or hematemesis.    She denies weight loss. Documented weights (in pounds; on various, different scales) include 144 12/22/22, 146 1/19/23, 155 1/18/24, 154 1/23/24, 155 2/12/24, 161  4/15/24, 158 5/1/24, 164 6/24/24, and 162 today.    Objective   Physical Exam  Constitutional:       Appearance: She is obese.   HENT:      Mouth/Throat:      Mouth: Mucous membranes are moist.   Eyes:      Conjunctiva/sclera: Conjunctivae normal.   Cardiovascular:      Rate and Rhythm: Normal rate and regular rhythm.      Pulses: Normal pulses.      Heart sounds: Normal heart sounds.   Pulmonary:      Effort: Pulmonary effort is normal.      Breath sounds: Normal breath sounds.   Abdominal:      General: There is no distension.      Palpations: Abdomen is soft. There is no mass.      Tenderness: There is no abdominal tenderness. There is no guarding or rebound.      Hernia: No hernia is present.   Skin:     General: Skin is warm.   Neurological:      Mental Status: She is oriented to person, place, and time.   Psychiatric:         Mood and Affect: Mood normal.         Assessment/Plan     Diarrhea-predominant irritable bowel syndrome, long-standing, without red flag symptoms or signs, with psychosocial difficulties including anxiety and depression.  Follow up with Ms. Lancaster - Nutrition and continue to follow a high fiber diet with fiber supplementation.  She will follow up with me in 2 to 3 months (which will be shortly after her next visit with Nutrition).  She wishes to continue the low FODMAP diet and PRN Imodium (which I discussed dosing recommendations) at this time. She is aware of potential alternative options, but she wishes to follow the diet at this time.    Pelvic floor dysfunction with stress urinary incontinence and dysfunctional defecatory pattern with straining. I discussed options for further evaluation including anorectal manometry and MRI defecography with potential management including pelvic floor physical therapy. She should also see a urogynecologist for additional evaluation and treatment options. The patient wishes to hold off on further evaluation.    Esophageal leiomyoma noted since 2014.  Risk of developing malignancy is extremely low. She has had serial EGD and EUS procedures with her last one done 9/2022. Dr. Deutsch recommended no further surveillance given stability over the past 8 years, but the patient had requested a repeat EUS in 2 years (September 2024). She has subsequently changed her mind, and she has decided to not pursue at this time.    Metabolic dysfunction-associated steatotic liver disease in setting of BMI = 28, hyperlipidemia, and prediabetes. The patient should follow up with her PCP for a repeat HbA1c (last one was done 12/22/22) and weight loss programs. She should also stop alcohol use (1 - 2 glasses red wine daily).     Occasional burning chest discomfort when food passes, possibly related to Schatzki ring, with improvement with PPI. She was instructed on the use of PPI. She can use Mylanta or TUMS PRN or Pepcid PRN instead.    Average risk colorectal cancer screening. The patient had a screening colonoscopy done 2019 which was normal. Further screening is not indicated given advanced age.    Chronic fatigue. She will follow up with Dr. Baker for further evaluation and management, including follow-up HbA1c.    Medical decision-making and time spent in both non-face-to-face time and face-to-face included time spent preparing to see patient, obtaining and/or reviewing separately obtained history, review and/or ordering of labs, radiology studies (including personal review of imaging), medical tests, and/or prior medical records (including summary of records), independently interpreting results and/or communicating results to patient, review and/or ordering of endoscopic procedures with risk factors, performing a medically necessary appropriate physical examination, counseling and educating the patient, communicating with other providers, care coordination, and documenting clinical information as noted above.    All the patient's questions were answered to her full satisfaction.      Adriano Rollins MD, TANVI  Chief Medical   [facility]  Associate Chief and Director of Clinical Operations  Division of Gastroenterology and Liver Disease  [facility] Master Clinician  Professor of Medicine  [facility]    cc: Marko Baker MD    Time Spent  Prep time on day of patient encounter: 55 minutes  Time spent directly with patient, family or caregiver: 35 minutes  Additional Time Spent on Patient Care Activities: 10 minutes  Documentation Time: 35 minutes  Other Time Spent: 0 minutes  Total: 135 minutes

## 2024-07-01 NOTE — PROGRESS NOTES
Subjective   Patient ID:   Ms. Alcantara is a 78 year old woman with BMI = 30, history of squamous cell cancer and basal cell cancer of skin, hyperlipidemia, prediabetes (last HbA1c = 5.7% 12/22/22), asthma/COPD (former smoker), anxiety, depression (on fluoxetine), metabolic dysfunction-associated steatotic liver disease (MASLD), diarrhea-predominant irritable bowel syndrome, benign esophageal leiomyoma (diagnosed 2014), recurrent UTIs (on prophylactic Macrobid PRN), stress urinary incontinence, small benign R renal parapelvic cyst, essential tremor (on propranolol), osteoporosis, and DJD who is here for follow up re: diarrhea-predominant IBS, pelvic floor dysfunction, history of esophageal leiomyoma, and MASLD.    The patient last saw me 4/15/24 for follow up re: diarrhea-predominant IBS, pelvic floor dysfunction, history of esophageal leiomyoma, and MASLD. The patient initially saw me 2/12/24 per request of Dr. Marko Baker for a second opinion re: her IBS. See notes from those dates for full details. The patient has been previously followed by Dr. Wallace Deutsch - [facility].     At her last visit, I had previously recommended C difficile stool study given repeated antibiotic usage for recurrent UTIs. However, she had submitted a formed stool, so the lab was unable to test. She had not re-submitted a stool sample. She reported that with the low FODMAP she has had significant improvement in her symptoms, and she was very pleased with the quality of her life at this time. She was reminded that a low FODMAP diet is not meant to be restrictive, but the point is to identify a particular food group that she is intolerant. She had her fourth follow up appointment with Ms. Anisha - Nutrition on 6/17/24. Ms. Lancaster recommended follow up in 8 weeks. She was taking Metamucil gummies (instead of previously recommended Citrucel - see HPI), and she was taking them infrequently. She had more recent issues  with constipation and straining with exacerbation of her external hemorrhoids. After discussion with her, she was going to try Optifiber ([employer] brand of wheat dextrin fiber supplement). I also discussed potential use with low dose amitriptyline, but the patient preferred to continue with the low FODMAP diet and initiation of fiber supplementation as noted above.    For her pelvic floor dysfunction with stress urinary incontinence and dysfunctional defecatory pattern with straining, I discussed options for further evaluation including anorectal manometry and MRI defecography with potential management including pelvic floor physical therapy. It was also recommended that she see a urogynecologist for additional evaluation and treatment options. After discussion, the patient wanted to hold off on further evaluation.    For her esophageal leiomyoma noted since 2014, she was told that the risk of developing malignancy is extremely low. She has had serial EGD and EUS procedures with her last one done 9/2022. Dr. Deutsch recommended no further surveillance given stability over the past 8 years, but the patient had requested a repeat EUS in 2 years (September 2024).     For her metabolic dysfunction-associated steatotic liver disease in setting of BMI = 28, hyperlipidemia, and prediabetes, it was recommended that she follow up with her PCP for a repeat HbA1c (last one was done 12/22/22) and weight loss programs. She was also told to stop alcohol use (1 - 2 glasses red wine daily). I recommended additional evaluation including repeat liver ultrasound (given that it has been 10 years since her last ultrasound) and Fibroscan, depending on ultrasound results. She still has not had a HBA1c done. She had a RUQ ultrasound done 5/6/24 which revealed mild hepatic steatosis with normal gallbladder and bile ducts. A Fibroscan 5/28/24 revealed moderate hepatic steatosis with stage 0-1 fibrosis. She was informed of these results, and  the previous recommendations regarding weight loss, healthy diet, exercise, and avoiding alcohol were reiterated with the patient.    For hepatitis C screening, which was reportedly done 4/20/16 at the [facility], the results were not in Epic Everywhere nor in the records from Dr. Deutsch's office, and she agreed to have this test done which was negative 4/30/24.    She also instructed to follow up with Dr. Baker for further evaluation and management, including follow-up HbA1c, for her chronic fatigue.    She reports that she feels 200% better in regards to her IBS-D and gaseous abdominal distention since starting the low FODMAP diet. She would like to continue follow up with Nutrition.  She has occasional days in which she eats certain foods that trigger symptoms. She will take Imodium PRN for loose stools, but she is using this less frequently then when she first started the low FODMAP diet.    She has had occasional transient burning sensation in her left breast/substernal chest area when food seems to pass this area. She has taken a PPI, possibly Protonix, which has helped in the past with it when used as a PRN medication.  She denies dysphagia, nausea, vomiting, or hematemesis.    She denies weight loss. Documented weights (in pounds; on various, different scales) include 144 12/22/22, 146 1/19/23, 155 1/18/24, 154 1/23/24, 155 2/12/24, 161 4/15/24, 158 5/1/24, 164 6/24/24, and 162 today.    Objective   Physical Exam  Constitutional:       Appearance: She is obese.   HENT:      Mouth/Throat:      Mouth: Mucous membranes are moist.   Eyes:      Conjunctiva/sclera: Conjunctivae normal.   Cardiovascular:      Rate and Rhythm: Normal rate and regular rhythm.      Pulses: Normal pulses.      Heart sounds: Normal heart sounds.   Pulmonary:      Effort: Pulmonary effort is normal.      Breath sounds: Normal breath sounds.   Abdominal:      General: There is no distension.      Palpations: Abdomen is soft. There  is no mass.      Tenderness: There is no abdominal tenderness. There is no guarding or rebound.      Hernia: No hernia is present.   Skin:     General: Skin is warm.   Neurological:      Mental Status: She is oriented to person, place, and time.   Psychiatric:         Mood and Affect: Mood normal.         Assessment/Plan     Diarrhea-predominant irritable bowel syndrome, long-standing, without red flag symptoms or signs, with psychosocial difficulties including anxiety and depression.  Follow up with Ms. Lancaster - Nutrition and continue to follow a high fiber diet with fiber supplementation.  She will follow up with me in 2 to 3 months (which will be shortly after her next visit with Nutrition).  She wishes to continue the low FODMAP diet and PRN Imodium (which I discussed dosing recommendations) at this time. She is aware of potential alternative options, but she wishes to follow the diet at this time.    Pelvic floor dysfunction with stress urinary incontinence and dysfunctional defecatory pattern with straining. I discussed options for further evaluation including anorectal manometry and MRI defecography with potential management including pelvic floor physical therapy. She should also see a urogynecologist for additional evaluation and treatment options. The patient wishes to hold off on further evaluation.    Esophageal leiomyoma noted since 2014. Risk of developing malignancy is extremely low. She has had serial EGD and EUS procedures with her last one done 9/2022. Dr. Deutsch recommended no further surveillance given stability over the past 8 years, but the patient had requested a repeat EUS in 2 years (September 2024). She has subsequently changed her mind, and she has decided to not pursue at this time.    Metabolic dysfunction-associated steatotic liver disease in setting of BMI = 28, hyperlipidemia, and prediabetes. The patient should follow up with her PCP for a repeat HbA1c (last one was done 12/22/22) and  weight loss programs. She should also stop alcohol use (1 - 2 glasses red wine daily).     Occasional burning chest discomfort when food passes, possibly related to Schatzki ring, with improvement with PPI. She was instructed on the use of PPI. She can use Mylanta or TUMS PRN or Pepcid PRN instead.    Average risk colorectal cancer screening. The patient had a screening colonoscopy done 2019 which was normal. Further screening is not indicated given advanced age.    Chronic fatigue. She will follow up with Dr. Baker for further evaluation and management, including follow-up HbA1c.    Medical decision-making and time spent in both non-face-to-face time and face-to-face included time spent preparing to see patient, obtaining and/or reviewing separately obtained history, review and/or ordering of labs, radiology studies (including personal review of imaging), medical tests, and/or prior medical records (including summary of records), independently interpreting results and/or communicating results to patient, review and/or ordering of endoscopic procedures with risk factors, performing a medically necessary appropriate physical examination, counseling and educating the patient, communicating with other providers, care coordination, and documenting clinical information as noted above.    All the patient's questions were answered to her full satisfaction.     Adriano Rollins MD, TANVI  Chief Medical   [facility]  Associate Chief and Director of Clinical Operations  Division of Gastroenterology and Liver Disease  [facility] Master Clinician  Professor of Medicine  [facility]    cc: Marko Baker MD    Time Spent  Prep time on day of patient encounter: 55 minutes  Time spent directly with patient, family or caregiver: 35 minutes  Additional Time Spent on Patient Care Activities: 10 minutes  Documentation Time: 35  minutes  Other Time Spent: 0 minutes  Total: 135 minutes

## 2024-07-01 NOTE — PATIENT INSTRUCTIONS
Thank you for seeing me! You report you are quite happy with how you have improved on the low FODMAP diet. Continue to see Ms. Lancaster as the diet is NOT meant to be restrictive, and we need to re-introduce food groups to identify which of them is causing you more problems. Also, you should increase fiber intake as we discussed to not only help with your IBS but also your hemorrhoids. Continue to try to incorporate vegetables and fruits, if possible, and to take Optifiber from [employer]; take 1 tablespoon daily. You can use Imodium as needed; 2 tablets after first loose stool then 1 tablet for each loose stool thereafter up to 8 tablets per day. For the metabolic dysfunction-associated steatotic liver disease (fat within liver), follow up with Dr. Baker re: checking a repeat HbA1c, weight loss programs, healthy diet, exercise, and avoid alcohol. You decided not to do follow up for your esophageal benign muscle wall tumor. You can take the heartburn medication as needed. If you take Protonix, it should be taken 30 minutes before the first meal of the day. Follow up with me after seeing Ms. Lancaster in 2 to 3 months.  You can contact my office at [phone] with any questions or concerns that arise before then.

## 2024-08-16 DIAGNOSIS — N39.0 RECURRENT UTI: ICD-10-CM

## 2024-08-29 ENCOUNTER — APPOINTMENT (OUTPATIENT)
Dept: PRIMARY CARE | Facility: CLINIC | Age: 79
End: 2024-08-29
Payer: MEDICARE

## 2024-09-16 NOTE — PROGRESS NOTES
Nutrition: Follow-up     Reason for Nutrition Visit: Patient is a 78 y.o. female referred for IBS-D, fatty liver, and abdominal bloating. Referred on 2/12/24 by Dr. Rollins.     Food and Nutrition Related History: Pt presents in office for visit #5. Reports IBS symptoms are improved but not at 100%. Very stressed about her current weight. Feels ravenous. She feels like she knows what she should be doing to manage her weight but struggling with cravings. Waking up at night to eat.       Visit #4:   Has been avoiding garlic as she associates it with GI symptoms. Followed the reintroduction protocol. Concerned about weight gain.   Week 1: some gassiness with black beans and onions, more severe symptoms with increased amounts   Week: 2: low fat milk caused gassiness   Week 3: blackberries triggered diarrhea, this week was particularly symptomatic   Week 5: reaction with honey, corona was okay   Week 6: no reaction to regular pasta/bagels       Visit #3  Ms. Alcantara presents in office. Stressful month and had difficulty reintroducing foods. She did do some reintroduction, such as broccoli, grapes. Both seemed to give her GI symptoms. Mainly eating potatoes, noodles, bread and other easy to digest foods. Has really limited seasonings. Understands the importance of reintroducing, and she is willing to try these next few weeks.     Visit #2  Has noticed significantly less diarrhea (has noticed 60-70% improvement), abdominal pain (has noticed 60% improvement), gassiness (has noticed 30-40% improvement). Still feels bloating.     Visit #1  Pt presents for nutrition counseling. Has significant IBS, predominantly diarrhea. Ongoing for years. Has abdominal pain, excessive gas (in the evening), bloating. Most of her life, she struggled with constipation. In more recent years, diarrhea is predominate symptom. Has loose, watery BM 4-5x per day. Sometimes needs to go within 10-15 minutes after eating and other times it seems to be  more erratic. Does not eat meat. Does eat poultry and fish. Followed a vegetarian diet briefly and developed anemia. Going to bed at 1-3am. Feels very hungry in the later evening hours. Lives alone and enjoys eating out. Has been taking Imodium as needed.     ----------------------  Food Allergy: none  Food Intolerance: sugar alcohols, some degree of lactose intolerance (especially with ice cream)  GI Symptoms: bloating, nausea, diarrhea, constipation, increased gas, abdominal pain GI Symptoms greater than 2 weeks: intermittent  Oral Problems: denies  Dentition: own  Cooking: Patient  Grocery Shopping: Patient  Alcohol: red wine 1-2 glasses daily  Dietary Supplements: Centrum Silver (tries to take it with crackers in the morning),   Food Insecurity: Denies    Physical Activity: mostly sedentary    Labs:  Low vitamin D = 29 (4/30/24). CMP: eGFR = 73 (4/30/24). Last lipid panel = normal (12/22/22). A1c = 5.7% suggestive of pre-DM (12/22/22).       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    Hyperlipidemia    Keratoconjunctivitis sicca of both eyes not specified as Sjogren's    Major depressive disorder, single episode, unspecified    Mixed conductive and sensorineural hearing loss of right ear with restricted hearing of left ear    Nasal vestibulitis    Nuclear senile cataract of both eyes    Obesity, Class I, BMI 30-34.9    Otosclerosis involving oval window, nonobliterative    Posterior vitreous detachment of both eyes    Presbyopia    Recurrent major depressive disorder, in partial remission (CMS/HCC)    Recurrent UTI    Senile osteoporosis    Skin cancer    Prediabetes    Osteoporosis     "Overweight with body mass index (BMI) 25.0-29.9    Breast cancer screening, high risk patient    Breast cancer screening, high risk patient        Anthropometrics:  Ht Readings from Last 1 Encounters:   02/20/24 1.575 m (5' 2\")     BMI Readings from Last 1 Encounters:   07/01/24 29.76 kg/m²     Wt Readings from Last 10 Encounters:   07/01/24 73.8 kg (162 lb 11.2 oz)   06/24/24 74.5 kg (164 lb 3.2 oz)   05/01/24 71.7 kg (158 lb)   04/15/24 73 kg (161 lb)   02/12/24 70.3 kg (155 lb)   01/23/24 70.3 kg (154 lb 15.7 oz)   01/23/24 69.9 kg (154 lb 3.2 oz)   01/18/24 70.3 kg (155 lb)   12/14/23 69.9 kg (154 lb)   01/19/23 66.2 kg (146 lb)     No clinically significant wt loss     Estimated Energy Needs:    Total Energy Estimated Needs (kCal): 1400 kCal   Method for Estimating Needs: MSJ 1146 x 1.2   Total Protein Estimated Needs (g): 56 g   Total Protein Estimated Needs (g/kg): 0.8 g/kg    Nutrition Diagnosis     Patient has Malnutrition Diagnosis: No     Patient has Nutrition Diagnosis: Yes     Diagnosis Status (1): Resolved   Nutrition Diagnosis 1: Food and nutrition related knowledge deficit Related to (1): no prior education on low FODMAP diet As Evidenced by (1): patient's desire to learn     Diagnosis Status (2): Onoging / Improved   Nutrition Diagnosis 2: Altered GI function  Nutrition Diagnosis 2: Altered GI function Related to (2): IBS As Evidenced by (2): patient reports diarrhea, bloating, excessive gas, abdominal pain                Nutrition Interventions/Recommendations   Referral to [facility]; provided scheduling information      Nutrition Monitoring and Evaluation   Estimated PO intake > 75% estimated energy needs - met, ongoing   Improvement in patient's self-reported GI symptoms - met, ongoing   Reintroduction of FODMAP foods - partially met, ongoing       Readiness to Change : Excellent  Level of Understanding : Excellent  Anticipated Compliant : Excellent     Follow-up:  with [facility] program     "

## 2024-09-17 ENCOUNTER — APPOINTMENT (OUTPATIENT)
Dept: PRIMARY CARE | Facility: CLINIC | Age: 79
End: 2024-09-17
Payer: MEDICARE

## 2024-09-17 DIAGNOSIS — K76.0 FATTY LIVER: ICD-10-CM

## 2024-09-17 DIAGNOSIS — Z71.3 DIETARY COUNSELING AND SURVEILLANCE: ICD-10-CM

## 2024-09-17 DIAGNOSIS — K58.0 IRRITABLE BOWEL SYNDROME WITH DIARRHEA: Primary | ICD-10-CM

## 2024-09-17 DIAGNOSIS — R14.0 ABDOMINAL BLOATING: ICD-10-CM

## 2024-10-22 ENCOUNTER — TELEPHONE (OUTPATIENT)
Dept: GASTROENTEROLOGY | Facility: HOSPITAL | Age: 79
End: 2024-10-22
Payer: MEDICARE

## 2024-11-01 ENCOUNTER — APPOINTMENT (OUTPATIENT)
Dept: OTOLARYNGOLOGY | Facility: CLINIC | Age: 79
End: 2024-11-01
Payer: MEDICARE

## 2024-11-06 DIAGNOSIS — M89.9 DISORDER OF BONE, UNSPECIFIED: ICD-10-CM

## 2024-11-06 DIAGNOSIS — M81.0 OSTEOPOROSIS WITHOUT CURRENT PATHOLOGICAL FRACTURE, UNSPECIFIED OSTEOPOROSIS TYPE: ICD-10-CM

## 2024-11-06 DIAGNOSIS — Z13.228 SCREENING FOR METABOLIC DISORDER: ICD-10-CM

## 2024-11-07 ENCOUNTER — LAB (OUTPATIENT)
Dept: LAB | Facility: LAB | Age: 79
End: 2024-11-07
Payer: MEDICARE

## 2024-11-07 DIAGNOSIS — M81.0 OSTEOPOROSIS WITHOUT CURRENT PATHOLOGICAL FRACTURE, UNSPECIFIED OSTEOPOROSIS TYPE: ICD-10-CM

## 2024-11-07 DIAGNOSIS — Z13.228 SCREENING FOR METABOLIC DISORDER: ICD-10-CM

## 2024-11-07 DIAGNOSIS — M89.9 DISORDER OF BONE, UNSPECIFIED: ICD-10-CM

## 2024-11-07 LAB
25(OH)D3 SERPL-MCNC: 36 NG/ML (ref 30–100)
ALBUMIN SERPL BCP-MCNC: 4.4 G/DL (ref 3.4–5)
ALP SERPL-CCNC: 66 U/L (ref 33–136)
ALT SERPL W P-5'-P-CCNC: 22 U/L (ref 7–45)
ANION GAP SERPL CALC-SCNC: 15 MMOL/L (ref 10–20)
AST SERPL W P-5'-P-CCNC: 21 U/L (ref 9–39)
BILIRUB SERPL-MCNC: 0.3 MG/DL (ref 0–1.2)
BUN SERPL-MCNC: 11 MG/DL (ref 6–23)
CALCIUM SERPL-MCNC: 9.5 MG/DL (ref 8.6–10.6)
CHLORIDE SERPL-SCNC: 104 MMOL/L (ref 98–107)
CO2 SERPL-SCNC: 24 MMOL/L (ref 21–32)
CREAT SERPL-MCNC: 0.8 MG/DL (ref 0.5–1.05)
EGFRCR SERPLBLD CKD-EPI 2021: 76 ML/MIN/1.73M*2
ERYTHROCYTE [DISTWIDTH] IN BLOOD BY AUTOMATED COUNT: 13.9 % (ref 11.5–14.5)
GLUCOSE SERPL-MCNC: 98 MG/DL (ref 74–99)
HCT VFR BLD AUTO: 37.5 % (ref 36–46)
HGB BLD-MCNC: 11.7 G/DL (ref 12–16)
MCH RBC QN AUTO: 29 PG (ref 26–34)
MCHC RBC AUTO-ENTMCNC: 31.2 G/DL (ref 32–36)
MCV RBC AUTO: 93 FL (ref 80–100)
NRBC BLD-RTO: 0 /100 WBCS (ref 0–0)
PLATELET # BLD AUTO: 225 X10*3/UL (ref 150–450)
POTASSIUM SERPL-SCNC: 4.4 MMOL/L (ref 3.5–5.3)
PROT SERPL-MCNC: 6.8 G/DL (ref 6.4–8.2)
RBC # BLD AUTO: 4.04 X10*6/UL (ref 4–5.2)
SODIUM SERPL-SCNC: 139 MMOL/L (ref 136–145)
WBC # BLD AUTO: 8.4 X10*3/UL (ref 4.4–11.3)

## 2024-11-07 PROCEDURE — 82306 VITAMIN D 25 HYDROXY: CPT

## 2024-11-07 PROCEDURE — 85027 COMPLETE CBC AUTOMATED: CPT

## 2024-11-07 PROCEDURE — 36415 COLL VENOUS BLD VENIPUNCTURE: CPT

## 2024-11-07 PROCEDURE — 80053 COMPREHEN METABOLIC PANEL: CPT

## 2024-11-25 ENCOUNTER — OFFICE VISIT (OUTPATIENT)
Dept: GASTROENTEROLOGY | Facility: HOSPITAL | Age: 79
End: 2024-11-25
Payer: MEDICARE

## 2024-11-25 VITALS
DIASTOLIC BLOOD PRESSURE: 89 MMHG | TEMPERATURE: 97.1 F | HEART RATE: 71 BPM | OXYGEN SATURATION: 96 % | WEIGHT: 167 LBS | BODY MASS INDEX: 30.54 KG/M2 | SYSTOLIC BLOOD PRESSURE: 140 MMHG

## 2024-11-25 DIAGNOSIS — R14.0 ABDOMINAL BLOATING: ICD-10-CM

## 2024-11-25 DIAGNOSIS — M62.89 PELVIC FLOOR DYSFUNCTION IN FEMALE: ICD-10-CM

## 2024-11-25 DIAGNOSIS — K58.0 IRRITABLE BOWEL SYNDROME WITH DIARRHEA: Primary | ICD-10-CM

## 2024-11-25 DIAGNOSIS — D13.0 LEIOMYOMA OF ESOPHAGUS: ICD-10-CM

## 2024-11-25 DIAGNOSIS — R73.03 PREDIABETES: ICD-10-CM

## 2024-11-25 DIAGNOSIS — K76.0 FATTY LIVER: ICD-10-CM

## 2024-11-25 PROCEDURE — 1036F TOBACCO NON-USER: CPT | Performed by: INTERNAL MEDICINE

## 2024-11-25 PROCEDURE — 1126F AMNT PAIN NOTED NONE PRSNT: CPT | Performed by: INTERNAL MEDICINE

## 2024-11-25 PROCEDURE — 99215 OFFICE O/P EST HI 40 MIN: CPT | Performed by: INTERNAL MEDICINE

## 2024-11-25 PROCEDURE — 1159F MED LIST DOCD IN RCRD: CPT | Performed by: INTERNAL MEDICINE

## 2024-11-25 PROCEDURE — 1160F RVW MEDS BY RX/DR IN RCRD: CPT | Performed by: INTERNAL MEDICINE

## 2024-11-25 SDOH — ECONOMIC STABILITY: FOOD INSECURITY: WITHIN THE PAST 12 MONTHS, YOU WORRIED THAT YOUR FOOD WOULD RUN OUT BEFORE YOU GOT MONEY TO BUY MORE.: NEVER TRUE

## 2024-11-25 SDOH — ECONOMIC STABILITY: FOOD INSECURITY: WITHIN THE PAST 12 MONTHS, THE FOOD YOU BOUGHT JUST DIDN'T LAST AND YOU DIDN'T HAVE MONEY TO GET MORE.: NEVER TRUE

## 2024-11-25 ASSESSMENT — LIFESTYLE VARIABLES
HOW MANY STANDARD DRINKS CONTAINING ALCOHOL DO YOU HAVE ON A TYPICAL DAY: 1 OR 2
AUDIT-C TOTAL SCORE: 1
HOW OFTEN DO YOU HAVE SIX OR MORE DRINKS ON ONE OCCASION: NEVER
HOW OFTEN DO YOU HAVE A DRINK CONTAINING ALCOHOL: MONTHLY OR LESS
SKIP TO QUESTIONS 9-10: 1

## 2024-11-25 ASSESSMENT — PATIENT HEALTH QUESTIONNAIRE - PHQ9
SUM OF ALL RESPONSES TO PHQ9 QUESTIONS 1 & 2: 0
2. FEELING DOWN, DEPRESSED OR HOPELESS: NOT AT ALL
1. LITTLE INTEREST OR PLEASURE IN DOING THINGS: NOT AT ALL

## 2024-11-25 ASSESSMENT — PAIN SCALES - GENERAL: PAINLEVEL_OUTOF10: 0-NO PAIN

## 2024-11-25 NOTE — PROGRESS NOTES
Subjective   Patient ID:   Ms. Alcantara is a 79-year-old woman with BMI = 30, history of squamous cell cancer and basal cell cancer of skin, hyperlipidemia, prediabetes (last HbA1c = 5.7% 12/22/22), asthma/COPD (former smoker), anxiety, depression (on fluoxetine), metabolic dysfunction-associated steatotic liver disease (MASLD), diarrhea-predominant irritable bowel syndrome, benign esophageal leiomyoma (diagnosed 2014), recurrent UTIs (on prophylactic Macrobid PRN), stress urinary incontinence, small benign R renal parapelvic cyst, essential tremor (on propranolol), osteoporosis, and DJD who is here for follow up re: diarrhea-predominant IBS, pelvic floor dysfunction, history of esophageal leiomyoma, and MASLD.      The patient last saw me 7/1/24 for follow up re: diarrhea-predominant IBS, pelvic floor dysfunction, history of esophageal leiomyoma, and MASLD. At that time, a high fiber diet with fiber supplementation (Optifiber), continued supervised (by nutritionist) low FODMAP diet, repeat HbA1c level, and alcohol cessation were recommended. A follow-up appointment was also recommended in 2 to 3 months. She presents now for follow-up.       She states she is doing much better symptom-wise compared to last year. She reports she did have a 3-day period in which she felt completely wiped out after having a loose bowel movement. She had lower abdominal pressure, gas, and bloating. She states she stopped drinking water, ate very little, and took a dose of Imodium and eventually felt back to normal. At that time, she stopped taking Metamucil. She has not resumed taking it.       She reports she was concerned about wanting to eat all the time and resultant weight gain. She discussed these concerns with Ms. Anisha Calhoun, and she referred the patient to the [facility]. However, she subsequently learned that this clinic prescribes medications, and she did not want to start another medication for this hunger sensation.        Documented weights (in pounds; on various, different scales) include 144 12/22/22, 146 1/19/23, 155 1/18/24, 154 1/23/24, 155 2/12/24, 161 4/15/24, 158 5/1/24, 164 6/24/24, 162 7/1/24, and 167 today.  She feels that the extra weight is causing a toll on her body.  Her PCP did not order a repeat HbA1c, and she asked that I do so.      She also reports she did not want to give up drinking red wine. She continues to do so with dinner.     Objective   Physical Exam  Constitutional:       Appearance: She is obese.   HENT:      Mouth/Throat:      Mouth: Mucous membranes are moist.   Eyes:      Conjunctiva/sclera: Conjunctivae normal.   Cardiovascular:      Rate and Rhythm: Normal rate and regular rhythm.   Pulmonary:      Effort: Pulmonary effort is normal.      Breath sounds: Normal breath sounds.   Abdominal:      General: Bowel sounds are normal. There is no distension.      Palpations: Abdomen is soft. There is no mass.      Tenderness: There is no abdominal tenderness. There is no guarding or rebound.      Hernia: No hernia is present.   Skin:     General: Skin is warm.   Neurological:      Mental Status: She is oriented to person, place, and time.   Psychiatric:         Mood and Affect: Mood normal.         Assessment/Plan     Diarrhea-predominant irritable bowel syndrome, long-standing, without red flag symptoms or signs, with psychosocial difficulties including anxiety and depression.  She was encouraged to follow up with Ms. Lancaster - Unique and to continue to follow a high fiber diet. She states she will resume fiber supplementation. She can consider using Citrucel instead of Metamucil as Citrucel is associated with less bloating compared to Metamucil.  She will follow up with me in 2 to 3 months.  She wishes to continue the low FODMAP diet and PRN Imodium (which I discussed dosing recommendations) at this time. She is aware of potential alternative options, but she wishes to follow the diet at this time.       Pelvic floor dysfunction with stress urinary incontinence and dysfunctional defecatory pattern with straining. I had previously discussed options for further evaluation including anorectal manometry and MRI defecography with potential management including pelvic floor physical therapy. She could also see a urogynecologist for additional evaluation and treatment options. The patient wishes to hold off on further evaluation.      Esophageal leiomyoma noted since 2014. The risk of developing malignancy is extremely low. She has had serial EGD and EUS procedures with her last one done 9/2022. Dr. Deutsch recommended no further surveillance given stability over the past 8 years, but the patient had requested a repeat EUS in 2 years (September 2024) based on his notes, but she had subsequently changed her mind, and she has decided to not pursue it.      Metabolic dysfunction-associated steatotic liver disease (MASLD) in setting of BMI = 30, hyperlipidemia, and prediabetes. A repeat HbA1c (last one was done 12/22/22) was ordered for her today per her request. She is aware of prior recommendations re: weight loss programs and avoidance of alcohol.      Average risk colorectal cancer screening. The patient had a screening colonoscopy done in 2019 which was normal. Further screening is not indicated given advanced age.     Medical decision-making and time spent in both non-face-to-face time and face-to-face included time spent preparing to see patient, obtaining and/or reviewing separately obtained history, review and/or ordering of labs, radiology studies (including personal review of imaging), medical tests, and/or prior medical records (including summary of records), independently interpreting results and/or communicating results to patient, review and/or ordering of endoscopic procedures with risk factors, performing a medically necessary appropriate physical examination, counseling and educating the patient,  communicating with other providers, care coordination, and documenting clinical information as noted above.      All the patient's questions were answered to her full satisfaction.       Adriano Rollins MD, TANVI   Chief Medical    [facility]   Associate Chief and Director of Clinical Operations   Division of Gastroenterology and Liver Disease   [facility] Master Clinician   Professor of Medicine   [facility]      cc: Marko Baker MD      Time Spent  Prep time on day of patient encounter: 55 minutes  Time spent directly with patient, family or caregiver: 25 minutes  Additional Time Spent on Patient Care Activities: 10 minutes  Documentation Time: 30 minutes  Other Time Spent: 0 minutes  Total: 120 minutes

## 2024-11-25 NOTE — LETTER
November 25, 2024     Marko Baker MD  [address] [place] [address]   [address] [address]    Patient: Juan Alberto Alcantara   YOB: 1945   Date of Visit: 11/25/2024       Dear Dr. Marko Baker MD:    Thank you for referring Juan Alberto Alcantara to me for evaluation. Below are my notes for this consultation.  If you have questions, please do not hesitate to call me. I look forward to following your patient along with you.       Sincerely,     Adriano Rollins MD      CC: No Recipients  ______________________________________________________________________________________    Subjective  Patient ID:   Ms. Alcantara is a 79-year-old woman with BMI = 30, history of squamous cell cancer and basal cell cancer of skin, hyperlipidemia, prediabetes (last HbA1c = 5.7% 12/22/22), asthma/COPD (former smoker), anxiety, depression (on fluoxetine), metabolic dysfunction-associated steatotic liver disease (MASLD), diarrhea-predominant irritable bowel syndrome, benign esophageal leiomyoma (diagnosed 2014), recurrent UTIs (on prophylactic Macrobid PRN), stress urinary incontinence, small benign R renal parapelvic cyst, essential tremor (on propranolol), osteoporosis, and DJD who is here for follow up re: diarrhea-predominant IBS, pelvic floor dysfunction, history of esophageal leiomyoma, and MASLD.      The patient last saw me 7/1/24 for follow up re: diarrhea-predominant IBS, pelvic floor dysfunction, history of esophageal leiomyoma, and MASLD. At that time, a high fiber diet with fiber supplementation (Optifiber), continued supervised (by nutritionist) low FODMAP diet, repeat HbA1c level, and alcohol cessation were recommended. A follow-up appointment was also recommended in 2 to 3 months. She presents now for follow-up.       She states she is doing much better symptom-wise compared to last year. She reports she did have a 3-day period in which she felt completely wiped out after having a loose bowel movement. She had lower abdominal  pressure, gas, and bloating. She states she stopped drinking water, ate very little, and took a dose of Imodium and eventually felt back to normal. At that time, she stopped taking Metamucil. She has not resumed taking it.       She reports she was concerned about wanting to eat all the time and resultant weight gain. She discussed these concerns with Ms. Anisha - Unique, and she referred the patient to the [facility] clinic. However, she subsequently learned that this clinic prescribes medications, and she did not want to start another medication for this hunger sensation.       Documented weights (in pounds; on various, different scales) include 144 12/22/22, 146 1/19/23, 155 1/18/24, 154 1/23/24, 155 2/12/24, 161 4/15/24, 158 5/1/24, 164 6/24/24, 162 7/1/24, and 167 today.  She feels that the extra weight is causing a toll on her body.  Her PCP did not order a repeat HbA1c, and she asked that I do so.      She also reports she did not want to give up drinking red wine. She continues to do so with dinner.     Objective  Physical Exam  Constitutional:       Appearance: She is obese.   HENT:      Mouth/Throat:      Mouth: Mucous membranes are moist.   Eyes:      Conjunctiva/sclera: Conjunctivae normal.   Cardiovascular:      Rate and Rhythm: Normal rate and regular rhythm.   Pulmonary:      Effort: Pulmonary effort is normal.      Breath sounds: Normal breath sounds.   Abdominal:      General: Bowel sounds are normal. There is no distension.      Palpations: Abdomen is soft. There is no mass.      Tenderness: There is no abdominal tenderness. There is no guarding or rebound.      Hernia: No hernia is present.   Skin:     General: Skin is warm.   Neurological:      Mental Status: She is oriented to person, place, and time.   Psychiatric:         Mood and Affect: Mood normal.         Assessment/Plan    Diarrhea-predominant irritable bowel syndrome, long-standing, without red flag symptoms or signs, with psychosocial  difficulties including anxiety and depression.  She was encouraged to follow up with Ms. Lancaster - Unique and to continue to follow a high fiber diet. She states she will resume fiber supplementation. She can consider using Citrucel instead of Metamucil as Citrucel is associated with less bloating compared to Metamucil.  She will follow up with me in 2 to 3 months.  She wishes to continue the low FODMAP diet and PRN Imodium (which I discussed dosing recommendations) at this time. She is aware of potential alternative options, but she wishes to follow the diet at this time.      Pelvic floor dysfunction with stress urinary incontinence and dysfunctional defecatory pattern with straining. I had previously discussed options for further evaluation including anorectal manometry and MRI defecography with potential management including pelvic floor physical therapy. She could also see a urogynecologist for additional evaluation and treatment options. The patient wishes to hold off on further evaluation.      Esophageal leiomyoma noted since 2014. The risk of developing malignancy is extremely low. She has had serial EGD and EUS procedures with her last one done 9/2022. Dr. Deutsch recommended no further surveillance given stability over the past 8 years, but the patient had requested a repeat EUS in 2 years (September 2024) based on his notes, but she had subsequently changed her mind, and she has decided to not pursue it.      Metabolic dysfunction-associated steatotic liver disease (MASLD) in setting of BMI = 30, hyperlipidemia, and prediabetes. A repeat HbA1c (last one was done 12/22/22) was ordered for her today per her request. She is aware of prior recommendations re: weight loss programs and avoidance of alcohol.      Average risk colorectal cancer screening. The patient had a screening colonoscopy done in 2019 which was normal. Further screening is not indicated given advanced age.     Medical decision-making and  time spent in both non-face-to-face time and face-to-face included time spent preparing to see patient, obtaining and/or reviewing separately obtained history, review and/or ordering of labs, radiology studies (including personal review of imaging), medical tests, and/or prior medical records (including summary of records), independently interpreting results and/or communicating results to patient, review and/or ordering of endoscopic procedures with risk factors, performing a medically necessary appropriate physical examination, counseling and educating the patient, communicating with other providers, care coordination, and documenting clinical information as noted above.      All the patient's questions were answered to her full satisfaction.       Adriano Rollins MD, TANVI   Chief Medical    [facility]   Associate Chief and Director of Clinical Operations   Division of Gastroenterology and Liver Disease   [facility] Master Clinician   Professor of Medicine   [facility]      cc: Marko Baker MD      Time Spent  Prep time on day of patient encounter: 55 minutes  Time spent directly with patient, family or caregiver: 25 minutes  Additional Time Spent on Patient Care Activities: 10 minutes  Documentation Time: 30 minutes  Other Time Spent: 0 minutes  Total: 120 minutes

## 2024-11-26 NOTE — PATIENT INSTRUCTIONS
Thank you for seeing me! You report you are quite happy with how you have improved on the low FODMAP diet. Continue to see Ms. Lancaster as the diet is NOT meant to be restrictive, and we need to re-introduce food groups to identify which of them is causing you more problems. Also, you should increase fiber intake as we discussed to not only help with your IBS but also your hemorrhoids. Continue to try to incorporate vegetables and fruits, if possible. You plan on resuming your fiber supplementation. You can take Citrucel instead of Metamucil as Citrucel is associated with less bloating compared to Metamucil. You can use Imodium as needed; 2 tablets after first loose stool then 1 tablet for each loose stool thereafter up to 8 tablets per day. For the metabolic dysfunction-associated steatotic liver disease (fat within liver), I have ordered a repeat HbA1c, per your request. Consider weight loss programs, healthy diet, exercise, and avoid alcohol. You decided not to do follow up for your benign esophageal muscle wall tumor. You can take the heartburn medication as needed. If you take Protonix, it should be taken 30 minutes before the first meal of the day. Follow up with me in 3 to 4 months.  You can contact my office at [phone] with any questions or concerns that arise before then.

## 2024-12-20 ENCOUNTER — APPOINTMENT (OUTPATIENT)
Dept: PRIMARY CARE | Facility: CLINIC | Age: 79
End: 2024-12-20
Payer: MEDICARE

## 2025-01-06 ENCOUNTER — APPOINTMENT (OUTPATIENT)
Dept: PRIMARY CARE | Facility: CLINIC | Age: 80
End: 2025-01-06
Payer: MEDICARE

## 2025-01-06 VITALS
HEART RATE: 68 BPM | RESPIRATION RATE: 16 BRPM | DIASTOLIC BLOOD PRESSURE: 80 MMHG | TEMPERATURE: 98.3 F | WEIGHT: 166.2 LBS | BODY MASS INDEX: 30.4 KG/M2 | SYSTOLIC BLOOD PRESSURE: 130 MMHG

## 2025-01-06 DIAGNOSIS — G25.0 BENIGN ESSENTIAL TREMOR: ICD-10-CM

## 2025-01-06 DIAGNOSIS — E78.49 OTHER HYPERLIPIDEMIA: ICD-10-CM

## 2025-01-06 DIAGNOSIS — E66.9 OBESITY (BMI 30-39.9): ICD-10-CM

## 2025-01-06 DIAGNOSIS — K75.81 NASH (NONALCOHOLIC STEATOHEPATITIS): ICD-10-CM

## 2025-01-06 DIAGNOSIS — M81.0 OSTEOPOROSIS WITHOUT CURRENT PATHOLOGICAL FRACTURE, UNSPECIFIED OSTEOPOROSIS TYPE: ICD-10-CM

## 2025-01-06 DIAGNOSIS — Z00.00 HEALTH CARE MAINTENANCE: ICD-10-CM

## 2025-01-06 DIAGNOSIS — L30.9 ECZEMA, UNSPECIFIED TYPE: Primary | ICD-10-CM

## 2025-01-06 PROCEDURE — 1036F TOBACCO NON-USER: CPT | Performed by: INTERNAL MEDICINE

## 2025-01-06 PROCEDURE — G0444 DEPRESSION SCREEN ANNUAL: HCPCS | Performed by: INTERNAL MEDICINE

## 2025-01-06 PROCEDURE — 1159F MED LIST DOCD IN RCRD: CPT | Performed by: INTERNAL MEDICINE

## 2025-01-06 PROCEDURE — 99497 ADVNCD CARE PLAN 30 MIN: CPT | Performed by: INTERNAL MEDICINE

## 2025-01-06 PROCEDURE — 1124F ACP DISCUSS-NO DSCNMKR DOCD: CPT | Performed by: INTERNAL MEDICINE

## 2025-01-06 PROCEDURE — G0439 PPPS, SUBSEQ VISIT: HCPCS | Performed by: INTERNAL MEDICINE

## 2025-01-06 PROCEDURE — 99214 OFFICE O/P EST MOD 30 MIN: CPT | Performed by: INTERNAL MEDICINE

## 2025-01-06 PROCEDURE — 1160F RVW MEDS BY RX/DR IN RCRD: CPT | Performed by: INTERNAL MEDICINE

## 2025-01-06 RX ORDER — PROPRANOLOL HYDROCHLORIDE 20 MG/1
20 TABLET ORAL 2 TIMES DAILY
Qty: 60 TABLET | Refills: 5 | Status: SHIPPED | OUTPATIENT
Start: 2025-01-06 | End: 2025-07-05

## 2025-01-06 RX ORDER — FLUOXETINE HYDROCHLORIDE 40 MG/1
40 CAPSULE ORAL DAILY
Qty: 90 CAPSULE | Refills: 3 | Status: SHIPPED | OUTPATIENT
Start: 2025-01-06

## 2025-01-06 RX ORDER — MOMETASONE FUROATE 1 MG/G
OINTMENT TOPICAL DAILY
Qty: 45 G | Refills: 0 | Status: SHIPPED | OUTPATIENT
Start: 2025-01-06 | End: 2026-01-06

## 2025-01-06 RX ORDER — ROSUVASTATIN CALCIUM 20 MG/1
20 TABLET, COATED ORAL DAILY
Qty: 90 TABLET | Refills: 3 | Status: SHIPPED | OUTPATIENT
Start: 2025-01-06

## 2025-01-06 ASSESSMENT — PATIENT HEALTH QUESTIONNAIRE - PHQ9
SUM OF ALL RESPONSES TO PHQ9 QUESTIONS 1 AND 2: 2
1. LITTLE INTEREST OR PLEASURE IN DOING THINGS: SEVERAL DAYS
10. IF YOU CHECKED OFF ANY PROBLEMS, HOW DIFFICULT HAVE THESE PROBLEMS MADE IT FOR YOU TO DO YOUR WORK, TAKE CARE OF THINGS AT HOME, OR GET ALONG WITH OTHER PEOPLE: NOT DIFFICULT AT ALL
2. FEELING DOWN, DEPRESSED OR HOPELESS: SEVERAL DAYS

## 2025-01-06 ASSESSMENT — ENCOUNTER SYMPTOMS
LOSS OF SENSATION IN FEET: 0
OCCASIONAL FEELINGS OF UNSTEADINESS: 0
DEPRESSION: 0

## 2025-01-06 NOTE — PROGRESS NOTES
Juan Alberto Alcantara is a 79 y.o. female here for a Medicare Wellness Exam.    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    Patient Self-Assessment of health: Good  Tobacco Use: No  Alcohol Use: Yes  Illicit drug use: No  Patient using opioids: No    Current Diet: well balanced  Adequate fluid intake: Yes  Caffeine intake: Yes  Exercise frequency: not active    Depression/Suicide screening: PHQ2/ PHQ9 (see screenings tab)    Hearing impairment: Yes  Uses hearing aids both  Cognitive impairment Observation: No   Patient or family reported cognitive impairment: No    Bathing: independent  Dressing: independent  Walking: independent  Taking Medications: independent  Feeding: independent  Personal Hygiene: independent  Managing Finances: with partial assistance  Shopping: independent  Housework/Basic Home Maintenance: independent  Handling transportation: independent  Preparing meals: independent    Bowels: continent  Bladder: continent    Falls Risk: has notfallen in last 6 months.   Their fall has not resulted in an  injury.   Fall risk Factors: Fall Risk Factors: Antihypertensive Use none   Care Plan Risk: Care Plan: Low/Moderate Risk: Regular physical activity such as walking, water aerobics or axel chi to improve strength, balance, coordination and flexibility. Wear appropriate, sensible shoe wear. Remove fall hazards at home such as loose rugs, obstacles, use non-slip surface in bath or shower. Keep living space well lit.      Home Safety Risk Factors: Home Safety Risk Factors: None  Advanced Directives:  Living will: No POA: No    Patient's End of Life Decisions: Provider agree to follow.      Past Medical History:   Diagnosis Date    Acute vaginitis 09/16/2014    Bacterial vaginosis    Encounter for other preprocedural examination 12/18/2018    Preoperative testing    Other abnormal and inconclusive findings on diagnostic imaging of breast 05/23/2017    Abnormal finding on breast imaging    Other specified disorders of breast 01/03/2019    Radial scar of breast    Personal history of other benign neoplasm 01/03/2019    History of other benign neoplasm    Personal history of other diseases of the respiratory system 09/12/2014    Personal history of asthma    Personal history of other malignant neoplasm of skin 09/12/2014    History of squamous cell carcinoma of skin    Personal history of other malignant neoplasm of skin 09/12/2014    History of basal cell carcinoma    Personal history of other specified conditions 12/15/2014    History of dysuria        Current Outpatient Medications   Medication Instructions    Advair Diskus 250-50 mcg/dose diskus inhaler 1 puff, 2 times daily RT    albuterol 90 mcg/actuation inhaler 2 puffs, Every 4 hours PRN    azelastine (Astelin) 137 mcg (0.1 %) nasal spray 2 sprays, 2 times daily    denosumab (Prolia) 60 mg/mL syringe Inject under the skin.    fexofenadine (Allegra) 180 mg tablet 1 tablet, Daily    FLUoxetine (PROZAC) 40 mg, oral, Daily    LYCOPENE ORAL Centrum Silver    mometasone  (Nasonex) 50 mcg/actuation nasal spray Administer into affected nostril(s).    multivitamin with minerals iron-free (Centrum Silver) Take by mouth.    nitrofurantoin, macrocrystal-monohydrate, (Macrobid) 100 mg capsule Take one tablet PRN (diarrhea) to prevent UTI    pantoprazole (ProtoNix) 40 mg EC tablet 1 tablet, Daily    propranolol (INDERAL) 20 mg, oral, 2 times daily    rosuvastatin (CRESTOR) 20 mg, oral, Daily       Review of Systems     Constitutional: no fever, no chills, not feeling poorly, not feeling tired and no recent weight gain, no recent weight loss.   ENT: no earache, no hearing loss, no nosebleeds, no nasal discharge, no sore throat and no hoarseness.   Cardiovascular: the heart rate was not slow, the heart rate was not fast, no chest pain, no palpitations, no intermittent leg claudication and no lower extremity edema.   Respiratory: no cough, wheezing or shortness of breath at rest or exertion  Gastrointestinal: no abdominal pain, no constipation, no melena, no nausea, no diarrhea, no vomiting and no blood in stools.   Musculoskeletal: no arthralgias, no myalgias, no back pain, no joint swelling, no joint stiffness, no limb pain and no limb swelling.   Integumentary: no skin rashes, no skin lesions, no itching, no skin wound and no dry skin.   Neurological: no headache, no confusion, no numbness, no dizziness, no tingling and no fainting.   All other systems have been reviewed and are negative for complaint.        Physical Exam    Constitutional   General appearance: Alert and in no acute distress.     Pulmonary   Respiratory assessment: No respiratory distress, normal respiratory rhythm and effort.    Auscultation of Lungs: Clear bilateral breath sounds.   Cardiovascular   Auscultation of heart: Apical pulse normal, heart rate and rhythm normal, normal S1 and S2, no murmurs and no pericardial rub.    Exam for edema: No peripheral edema.   Abdomen   Abdominal Exam: No bruits, normal bowel  sounds, soft, non-tender, no abdominal mass palpated.    Liver and Spleen exam: No hepato-splenomegaly.   Musculoskeletal   Examination of gait: Normal.    Inspection of digits and nails: No clubbing or cyanosis of the fingernails.    Inspection/palpation of joints, bones and muscles: No joint swelling. Normal movement of all extremities.   Skin   Skin inspection: Normal skin color and pigmentation, normal skin turgor and no visible rash.   Neurologic   Cranial nerves: Nerves 2-12 were intact, no focal neuro defects.       Assessment/Plan          Patient with a past medical history of osteoporosis on Prolia (DEXA 2024), Breast Ca followed by breast team, HLD, controlled anxiety/depression, recurrent UTI, Asthma, CHAMORRO, Mammogram due, Colonoscopy is up to date, stress incontinence, essential tremors, IBS (diarrhea)    # Facial rash  ? Etiology  Elocon cream BID     #Essential tremors  Being helped with propranolol  Asthma has not affected  If it ever does then be will stop and try primidone     #Asthma  Allergy induced  Stable on Advair     #Dyslipidemia  Continue statins        #Osteoporosis  On Prolia  Scheduled for DEXA scan in the coming months     #Depression with anxiety  Stable continue home medications     #History of breast carcinoma  Scheduled for mammogram through her gynecologist     #Acid reflux  Takes PPI on occasions      Advance Directives Discussion  less than 30 minutes spent discussing Advanced Care Planning (including a Living Will, Healthcare POA, as well as specific end of life choices and/or directives). The details of that discussion were documented in Advanced Directives Discussion section of the medical record.         Depression Screening  _5_ minutes  were spent screening for depression using PHQ2/PHQ9 as documented in the chart.     Alcohol Screening  _1_ minutes were spent screening for alcohol use/misuse as documented in the chart.          Cardiac Risk Assessment  __minutes were spent  assessing and discussing cardiovascular risk was and, if needed, lifestyle modifications recommended, including nutritional choices, exercise, and elimination of habits contributing to risk. Aspirin use/disuse was discussed following the guidelines below.     Low dose ASA ( mg) should be considered:  If you have prior Heart Attack/Stroke/Peripheral vascular disease:  Generally recommend daily low dose aspirin unless extremely high bleeding risk (e.g., gastrointestinal).     If you do not have prior Heart Attack/Stroke/Peripheral vascular disease:    Age < 70 and your 10-year cardiovascular disease risk is >20%, use low dose Aspirin   Age >=70: Do not use Aspirin for prevention  Low Dose CT Screening  We discussed the pros and cons of low dose CT screening for lung cancer based on the patient's smoking history.  This screening is recommended for patients who:  Are between the age of 50 to 77  Have a 20 pack-year smoking history (20 years of smoking a pack a day)  Are either a current smoker or have quit smoking within the last 15 years  Are in good health - no new cough or unexplained weight loss  Are willing to do the follow-up testing and treatment, if needed  Have not had a chest CT (CAT) scan in the last year

## 2025-01-23 NOTE — PROGRESS NOTES
TO do see that this lady    Juan Alberto Alcantara female   1945 79 y.o.   [id number]             HPI  Juan Alberto Alcantara is a 79 y.o. year old  female [facility] risk breast surveillance care. 2016 right breast ultrasound core biopsy evidenced intraductal papilloma, sclerosing adenosis and apocrine metaplasia. This was not initially surgically excised. Followed imaging was performed 2017 & 2017 when it was decided she wanted to proceed with excision biopsy performed by Dr Denis Pimentel 2017 showing complex sclerosing lesion, usual ductal hyperplasia and apocrine metaplasia without atypia. 2018 right ultrasound core biopsy of subareolar mass noted sclerosing lesion, with intraductal papilloma, usual ductal hyperplasia & apocrine metaplasia. 2018 right magseed excision with Denis Pimentel noted intraductal papilloma, flat epithelial atypia and PASH. She has history of remote breast benign biopsy. She took Raloxifene for 12 years for bone loss. She has family history of breast cancer in her mother age 42 and 2 paternal aunts & paternal niece.     She wants to continue annual care in the [facility] with clinical breast exams.      BREAST IMAGIN2024 Screening mammogram, BI-RADS Category 2. No breast MRI performed     REPRODUCTIVE HISTORY: Menarche age 13, first birth age 30, menopause age 57, MOE/BSO, no HRT, >1 benign breast biopsies, scattered fibroglandular breast tissue     FAMILY CANCER HISTORY:  Mother: breast cancer age 42  Paternal aunt (1): breast cancer  Paternal aunt (2): breast cancer  Paternal niece: breast cancer  Maternal grandfather: colon cancer   Brother: lung cancer       REVIEW OF SYSTEMS    Constitutional:  Negative for appetite change, fatigue, fever and unexpected weight change.   HENT:  Negative for ear pain, hearing loss, nosebleeds, sore throat and trouble swallowing.    Eyes:  Negative for discharge, itching and visual disturbance.    Respiratory:  Negative for cough, chest tightness and shortness of breath.    Cardiovascular:  Negative for chest pain, palpitations and leg swelling.   Breast: as indicated in HPI  Gastrointestinal:  Negative for abdominal pain, constipation, diarrhea and nausea.   Endocrine: Negative for cold intolerance and heat intolerance.   Genitourinary:  Negative for dysuria, frequency, hematuria, pelvic pain and vaginal bleeding.   Musculoskeletal:  Negative for arthralgias, back pain, gait problem, joint swelling and myalgias.   Skin:  Negative for color change and rash.   Allergic/Immunologic: Negative for environmental allergies and food allergies.   Neurological:  Negative for dizziness, tremors, speech difficulty, weakness, numbness and headaches.   Hematological:  Does not bruise/bleed easily.   Psychiatric/Behavioral:  Negative for agitation, dysphoric mood and sleep disturbance. The patient is not nervous/anxious.         MEDICATIONS  Current Outpatient Medications   Medication Instructions    Advair Diskus 250-50 mcg/dose diskus inhaler 1 puff, 2 times daily RT    albuterol 90 mcg/actuation inhaler 2 puffs, Every 4 hours PRN    azelastine (Astelin) 137 mcg (0.1 %) nasal spray 2 sprays, 2 times daily    denosumab (Prolia) 60 mg/mL syringe Inject under the skin.    fexofenadine (Allegra) 180 mg tablet 1 tablet, Daily    FLUoxetine (PROZAC) 40 mg, oral, Daily    LYCOPENE ORAL Centrum Silver    mometasone (Elocon) 0.1 % ointment Topical, Daily    multivitamin with minerals iron-free (Centrum Silver) Take by mouth.    nitrofurantoin, macrocrystal-monohydrate, (Macrobid) 100 mg capsule Take one tablet PRN (diarrhea) to prevent UTI    pantoprazole (ProtoNix) 40 mg EC tablet 1 tablet, Daily    propranolol (INDERAL) 20 mg, oral, 2 times daily    rosuvastatin (CRESTOR) 20 mg, oral, Daily    tirzepatide (weight loss) (ZEPBOUND) 2.5 mg, subcutaneous, Every 7 days        ALLERGIES  Allergies   Allergen Reactions    Adhesive  Tape-Silicones Other and Rash     Skin becomes red and painful    Hydromorphone (Bulk) Other     Upset stomach  Vomiting    Penicillins Rash and Unknown     Ineffective and does not feel well    House Dust Unknown and Other    Hydromorphone Other and Nausea/vomiting    Meperidine Other    Mold Unknown        SOCIAL HISTORY    Family History   Problem Relation Name Age of Onset    Breast cancer Mother      Other (Cardiac Disorder) Father      Colon cancer Maternal Grandfather      Breast cancer Other Paternal Aunt     Lung cancer Sibling      Thyroid cancer Sibling           Social History     Tobacco Use    Smoking status: Former     Types: Cigarettes     Passive exposure: Never    Smokeless tobacco: Never   Substance Use Topics    Alcohol use: Yes     Comment: wine socially        VITALS  Vitals:    01/27/25 1246   BP: 141/84   Pulse: 71          PHYSICAL EXAM  Patient is alert and oriented x3, with appropriate mood. Her gait is steady and hand grasps are equal. Sclera clear. The breasts are asymmetrical, left with more ptosis. Right breast has healed partial circumareolar incision from excisional biopsy. The tissue is soft without palpable abnormalities, discrete nodules or masses. The skin and nipples appear normal. There is no cervical, supraclavicular, or axillary lymphadenopathy palpable. Heart rate and rhythm normal, S1 and S2 appreciated. The lungs are clear bilaterally. Abdomen is soft, non-tender.     Physical Exam  Chest:              IMAGING    Bilateral screening mammogram today, results are pending    Time was spent viewing digital images of the radiology testing with the patient.    RISK PROFILE      ORDERS  Orders Placed This Encounter   Procedures    BI mammo bilateral screening tomosynthesis     Standing Status:   Future     Standing Expiration Date:   3/23/2026     Order Specific Question:   Perform a breast ultrasound if clinically indicated by Radiologist?     Answer:   Yes     Order Specific  Question:   Previous Mamm performed at  location?     Answer:   Yes     Order Specific Question:   Reason for exam:     Answer:   .     Order Specific Question:   Radiologist to Determine Optimal Study     Answer:   Yes     Order Specific Question:   Release result to [employer]     Answer:   Immediate     Order Specific Question:   Is this exam part of a Research Study? If Yes, link this order to the research study     Answer:   No           ASSESSMENT/PLAN  1. Encounter for screening mammogram for malignant neoplasm of breast  BI mammo bilateral screening tomosynthesis      2. At high risk for breast cancer  Clinic Appointment Request    Clinic Appointment Request             Follow up in about 1 year (around 1/27/2026) for with or after recommended imaging.  HIGH RISK PLAN  Yearly mammogram with digital breast tomosynthesis  Yearly clinical breast examinations  Breast MRI (to schedule call [phone]) not recommended  Monthly self breast examinations &/or regular self breast awareness  Vitamin D3 2000 IU/daily (over the counter) unless your PCP recommends you take a specific dose  Exercise 3-4 times per week for 45-60 minutes  Limit alcohol to 3-4 drinks per week if you are menopausal  Eat healthy low-fat diet with lots of vegetable & fruits  Risk model indicate personal risk of breast cancer in the next 5 years and lifetime (age 85-90):  True: lifetime risk 16.2%, (average 2.6%)  You took Raloxifene for 12 years reducing your risk of breast cancer.     Olesya Varghese, [facility] [place]

## 2025-01-24 ENCOUNTER — HOSPITAL ENCOUNTER (OUTPATIENT)
Dept: RADIOLOGY | Facility: CLINIC | Age: 80
End: 2025-01-24
Payer: MEDICARE

## 2025-01-27 ENCOUNTER — OFFICE VISIT (OUTPATIENT)
Dept: SURGICAL ONCOLOGY | Facility: CLINIC | Age: 80
End: 2025-01-27
Payer: MEDICARE

## 2025-01-27 ENCOUNTER — HOSPITAL ENCOUNTER (OUTPATIENT)
Dept: RADIOLOGY | Facility: CLINIC | Age: 80
Discharge: HOME | End: 2025-01-27
Payer: MEDICARE

## 2025-01-27 VITALS — WEIGHT: 166.23 LBS | BODY MASS INDEX: 30.59 KG/M2 | HEIGHT: 62 IN

## 2025-01-27 VITALS
BODY MASS INDEX: 30.17 KG/M2 | DIASTOLIC BLOOD PRESSURE: 84 MMHG | HEART RATE: 71 BPM | WEIGHT: 165 LBS | SYSTOLIC BLOOD PRESSURE: 141 MMHG

## 2025-01-27 DIAGNOSIS — Z91.89 AT HIGH RISK FOR BREAST CANCER: ICD-10-CM

## 2025-01-27 DIAGNOSIS — Z12.31 ENCOUNTER FOR SCREENING MAMMOGRAM FOR MALIGNANT NEOPLASM OF BREAST: Primary | ICD-10-CM

## 2025-01-27 DIAGNOSIS — Z00.00 HEALTHCARE MAINTENANCE: ICD-10-CM

## 2025-01-27 PROCEDURE — 99214 OFFICE O/P EST MOD 30 MIN: CPT | Performed by: NURSE PRACTITIONER

## 2025-01-27 PROCEDURE — 77067 SCR MAMMO BI INCL CAD: CPT

## 2025-01-27 PROCEDURE — 1159F MED LIST DOCD IN RCRD: CPT | Performed by: NURSE PRACTITIONER

## 2025-01-27 PROCEDURE — 1126F AMNT PAIN NOTED NONE PRSNT: CPT | Performed by: NURSE PRACTITIONER

## 2025-01-27 PROCEDURE — 77067 SCR MAMMO BI INCL CAD: CPT | Performed by: STUDENT IN AN ORGANIZED HEALTH CARE EDUCATION/TRAINING PROGRAM

## 2025-01-27 PROCEDURE — 1036F TOBACCO NON-USER: CPT | Performed by: NURSE PRACTITIONER

## 2025-01-27 PROCEDURE — 77063 BREAST TOMOSYNTHESIS BI: CPT | Performed by: STUDENT IN AN ORGANIZED HEALTH CARE EDUCATION/TRAINING PROGRAM

## 2025-01-27 PROCEDURE — 1160F RVW MEDS BY RX/DR IN RCRD: CPT | Performed by: NURSE PRACTITIONER

## 2025-01-27 ASSESSMENT — PAIN SCALES - GENERAL: PAINLEVEL_OUTOF10: 0-NO PAIN

## 2025-01-27 ASSESSMENT — PATIENT HEALTH QUESTIONNAIRE - PHQ9
1. LITTLE INTEREST OR PLEASURE IN DOING THINGS: NOT AT ALL
2. FEELING DOWN, DEPRESSED OR HOPELESS: NOT AT ALL
SUM OF ALL RESPONSES TO PHQ9 QUESTIONS 1 & 2: 0

## 2025-02-03 ENCOUNTER — APPOINTMENT (OUTPATIENT)
Dept: AUDIOLOGY | Facility: CLINIC | Age: 80
End: 2025-02-03

## 2025-02-03 DIAGNOSIS — H90.A22 SENSORINEURAL HEARING LOSS (SNHL) OF LEFT EAR WITH RESTRICTED HEARING OF RIGHT EAR: ICD-10-CM

## 2025-02-03 DIAGNOSIS — H90.A31 MIXED CONDUCTIVE AND SENSORINEURAL HEARING LOSS OF RIGHT EAR WITH RESTRICTED HEARING OF LEFT EAR: Primary | ICD-10-CM

## 2025-02-03 PROCEDURE — V5267 HEARING AID SUP/ACCESS/DEV: HCPCS

## 2025-02-03 ASSESSMENT — PAIN SCALES - GENERAL: PAINLEVEL_OUTOF10: 0 - NO PAIN

## 2025-02-03 ASSESSMENT — PAIN - FUNCTIONAL ASSESSMENT: PAIN_FUNCTIONAL_ASSESSMENT: 0-10

## 2025-02-11 NOTE — PROGRESS NOTES
ADULT HEARING AID CHECK      Name:  Juan Alberto Alcantara   :  1945   Age:  79 y.o.   Date of Evaluation:  2/3/2025     Time: 0638-8815      HISTORY:  Juan Alberto Alcantara is in for a hearing aid check. She noted her hearing aid  was broken. She noted that she likes the size and fit of her current domes. She would like her  replaced. She would also like to have her ears checked for wax today.       HEARING AIDS:  Her  was replaced. Listening check revealed the hearing aids were in good working condition. She was also provided spare domes. Otoscopy revealed slight non-occluding cerumen in the right ear, left ear clear. I noted she can follow-up with GERALD Dudley for annual/semi-annual ear cleanings.       Hearing Aid Information Right Left    Phonak Phonak   Model Audéo D17-960F Audéo M31-500L   Serial Number [id number]   /Tubing 0S 0S   Dome Medium   Closed Small  Closed   Retention Yes Yes   Wax Traps Cerustop Cerustop   Battery size 312 (brown) size 312 (brown)   Other Equipment Com-   * Hearing aids pair to [employer] via [employer].      Repair Warranty  (2020)   Loss and Damage Warranty  (2020)   Fitting Date    Fitting Audiologist Outside of       Paired to Phone for phone calls: No  Paired to smart phone application: No;   Fitting formula: Adaptive Phonak Digital  Gain Level: 100%  Volume controls active: Yes  Feedback Manager Performed: Yes     Programs:  [employer]  Phone via [employer] + singh       IMPRESSIONS:  She is doing well overall with her hearing aids. She should follow-up as needed for hearing aid clean and checks. She should return for an annual audiologic assessment in May 2025. She paid for her  replacement at check-out.       RECOMMENDATIONS:  1.) Return as needed for hearing aid clean and checks.   2.) Return for annual audiologic assessment in May.  3.) Continue full-time use of hearing aids.        Shira Goldsmith, CCC-A  Clinical Audiologist

## 2025-02-20 ENCOUNTER — APPOINTMENT (OUTPATIENT)
Dept: RADIOLOGY | Facility: CLINIC | Age: 80
End: 2025-02-20
Payer: MEDICARE

## 2025-02-24 ENCOUNTER — APPOINTMENT (OUTPATIENT)
Dept: RADIOLOGY | Facility: CLINIC | Age: 80
End: 2025-02-24
Payer: MEDICARE

## 2025-03-12 ENCOUNTER — APPOINTMENT (OUTPATIENT)
Dept: OTOLARYNGOLOGY | Facility: CLINIC | Age: 80
End: 2025-03-12
Payer: MEDICARE

## 2025-03-12 VITALS — WEIGHT: 167.5 LBS | BODY MASS INDEX: 30.82 KG/M2 | HEIGHT: 62 IN

## 2025-03-12 DIAGNOSIS — H90.A31 MIXED CONDUCTIVE AND SENSORINEURAL HEARING LOSS OF RIGHT EAR WITH RESTRICTED HEARING OF LEFT EAR: ICD-10-CM

## 2025-03-12 DIAGNOSIS — H90.A22 SENSORINEURAL HEARING LOSS (SNHL) OF LEFT EAR WITH RESTRICTED HEARING OF RIGHT EAR: ICD-10-CM

## 2025-03-12 DIAGNOSIS — H61.23 BILATERAL IMPACTED CERUMEN: Primary | ICD-10-CM

## 2025-03-12 PROCEDURE — 1036F TOBACCO NON-USER: CPT | Performed by: NURSE PRACTITIONER

## 2025-03-12 PROCEDURE — 1159F MED LIST DOCD IN RCRD: CPT | Performed by: NURSE PRACTITIONER

## 2025-03-12 PROCEDURE — 1160F RVW MEDS BY RX/DR IN RCRD: CPT | Performed by: NURSE PRACTITIONER

## 2025-03-12 PROCEDURE — 99213 OFFICE O/P EST LOW 20 MIN: CPT | Performed by: NURSE PRACTITIONER

## 2025-03-12 PROCEDURE — 69210 REMOVE IMPACTED EAR WAX UNI: CPT | Performed by: NURSE PRACTITIONER

## 2025-03-12 ASSESSMENT — PATIENT HEALTH QUESTIONNAIRE - PHQ9
2. FEELING DOWN, DEPRESSED OR HOPELESS: NOT AT ALL
1. LITTLE INTEREST OR PLEASURE IN DOING THINGS: NOT AT ALL
SUM OF ALL RESPONSES TO PHQ9 QUESTIONS 1 AND 2: 0

## 2025-03-12 NOTE — PROGRESS NOTES
Subjective   Patient ID: Juan Alberto Alcantara is a 79 y.o. female who presents for Follow-up.  HPI  This patient presents for a follow-up visit.  In review, she initially saw me in May 2020 for for cerumen removal.  At that time, she was having significant difficulty hearing even with her hearing aids as well as right-sided otalgia.  She had a very hard piece of cerumen adherent to the right canal wall.  Patient reports that after that cleaning her pain resolved and her hearing aid performance significantly improved.  She has been following with our audiology department and was again noted to have cerumen in the right ear canal.  Today, she denies any otalgia, otorrhea.  She is very happy with the performance of her hearing aids.  Review of Systems  A comprehensive or 10 points review of the patient's constitutional, neurological, HEENT, pulmonary, cardiovascular and genito-urinary systems showed only those mentioned in history of present illness.    Objective   Physical Exam  Constitutional: no fever, chills, weight loss or weight gain   General appearance: Appears well, well-nourished, well groomed. No acute distress.   Communication: Normal communication   Psychiatric: Oriented to person, place and time. Normal mood and affect.   Neurologic: Cranial nerves II-XII grossly intact and symmetric bilaterally.   Head and Face:   Head: Atraumatic with no masses, lesions or scarring.   Face: Normal symmetry, no paralysis, synkinesis or facial tic. No scars or deformities.     Eyes: Conjunctiva not edematous or erythematous   Ears: External inspection of ears with no deformity, scars or masses.  Bilateral canals with cerumen impactions     Neck: Normal appearing, symmetric, trachea midline.   Cardiovascular: Examination of peripheral vascular system shows no clubbing or cyanosis.   Respiratory: No respiratory distress increased work of breathing. Inspection of the chest with symmetric chest expansion and normal respiratory  effort.   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    Reassurance given that otologic exam looks great after cleaning.  She may follow-up as needed.  All questions were answered to patient's satisfaction.    This note was created using speech recognition transcription software. Despite proofreading, several typographical errors might be present that might affect the meaning of the content. Please call with any questions.  Patient ID: Juan Alberto Alcantara is a 79 y.o. female.    Ear cerumen removal    Date/Time: 3/12/2025 11:58 AM    Performed by: GERALD Lara  Authorized by: GERALD Lara    Consent:     Consent obtained:  Verbal    Consent given by:  Patient    Risks discussed:  Pain    Alternatives discussed:  No treatment  Procedure details:     Location:  L ear and R ear    Procedure type: curette      Procedure outcomes: cerumen removed    Post-procedure details:     Inspection:  No bleeding, ear canal clear and TM intact    Hearing quality:  Improved    Procedure completion:  Tolerated well, no immediate complications      GERALD Lara 03/12/25 11:56 AM

## 2025-03-20 ENCOUNTER — HOSPITAL ENCOUNTER (OUTPATIENT)
Dept: RADIOLOGY | Facility: CLINIC | Age: 80
Discharge: HOME | End: 2025-03-20
Payer: MEDICARE

## 2025-03-20 DIAGNOSIS — M81.0 OSTEOPOROSIS WITHOUT CURRENT PATHOLOGICAL FRACTURE, UNSPECIFIED OSTEOPOROSIS TYPE: ICD-10-CM

## 2025-03-20 PROCEDURE — 77080 DXA BONE DENSITY AXIAL: CPT | Performed by: RADIOLOGY

## 2025-03-20 PROCEDURE — 77080 DXA BONE DENSITY AXIAL: CPT

## 2025-05-13 ENCOUNTER — HOSPITAL ENCOUNTER (OUTPATIENT)
Dept: RADIOLOGY | Facility: CLINIC | Age: 80
Discharge: HOME | End: 2025-05-13
Payer: MEDICARE

## 2025-05-13 DIAGNOSIS — E78.49 OTHER HYPERLIPIDEMIA: ICD-10-CM

## 2025-05-13 PROCEDURE — 75571 CT HRT W/O DYE W/CA TEST: CPT

## 2025-05-14 DIAGNOSIS — Z87.39 HX OF OSTEOPOROSIS: ICD-10-CM

## 2025-05-14 DIAGNOSIS — M81.0 SENILE OSTEOPOROSIS: ICD-10-CM

## 2025-05-14 DIAGNOSIS — Z00.00 WELLNESS EXAMINATION: Primary | ICD-10-CM

## 2025-05-16 DIAGNOSIS — R93.1 ELEVATED CORONARY ARTERY CALCIUM SCORE: Primary | ICD-10-CM

## 2025-05-16 DIAGNOSIS — E78.49 OTHER HYPERLIPIDEMIA: ICD-10-CM

## 2025-05-16 NOTE — RESULT ENCOUNTER NOTE
Results reviewed.  The calcium score is 34  It is in the low normal range  Continue with cholesterol medicine  I will need to check a lipid panel, I do not have a lipid panel for this year to make sure it is under control  I will order a lipid panel   Please get it done at the lab  Please call if you have any questions or concerns.   Eliptical Excision Additional Text (Leave Blank If You Do Not Want): The margin was drawn around the clinically apparent lesion.  An elliptical shape was then drawn on the skin incorporating the lesion and margins.  Incisions were then made along these lines to the appropriate tissue plane and the lesion was extirpated.

## 2025-06-09 ENCOUNTER — APPOINTMENT (OUTPATIENT)
Dept: PRIMARY CARE | Facility: CLINIC | Age: 80
End: 2025-06-09
Payer: MEDICARE

## 2025-06-09 VITALS
TEMPERATURE: 98.4 F | SYSTOLIC BLOOD PRESSURE: 140 MMHG | DIASTOLIC BLOOD PRESSURE: 90 MMHG | RESPIRATION RATE: 16 BRPM | HEART RATE: 72 BPM | WEIGHT: 161.4 LBS | BODY MASS INDEX: 29.52 KG/M2

## 2025-06-09 DIAGNOSIS — K76.0 FATTY LIVER: ICD-10-CM

## 2025-06-09 DIAGNOSIS — R73.03 PREDIABETES: ICD-10-CM

## 2025-06-09 DIAGNOSIS — R73.9 HYPERGLYCEMIA: ICD-10-CM

## 2025-06-09 DIAGNOSIS — Z00.00 HEALTH CARE MAINTENANCE: ICD-10-CM

## 2025-06-09 DIAGNOSIS — G25.0 BENIGN ESSENTIAL TREMOR: ICD-10-CM

## 2025-06-09 DIAGNOSIS — E78.49 OTHER HYPERLIPIDEMIA: ICD-10-CM

## 2025-06-09 DIAGNOSIS — R91.8 PULMONARY NODULES: Primary | ICD-10-CM

## 2025-06-09 DIAGNOSIS — J45.40 MODERATE PERSISTENT ASTHMA, UNSPECIFIED WHETHER COMPLICATED (HHS-HCC): ICD-10-CM

## 2025-06-09 PROCEDURE — G2211 COMPLEX E/M VISIT ADD ON: HCPCS | Performed by: INTERNAL MEDICINE

## 2025-06-09 PROCEDURE — 99214 OFFICE O/P EST MOD 30 MIN: CPT | Performed by: INTERNAL MEDICINE

## 2025-06-09 RX ORDER — ROSUVASTATIN CALCIUM 20 MG/1
20 TABLET, COATED ORAL DAILY
Qty: 90 TABLET | Refills: 3 | Status: SHIPPED | OUTPATIENT
Start: 2025-06-09

## 2025-06-09 RX ORDER — FLUOXETINE HYDROCHLORIDE 40 MG/1
40 CAPSULE ORAL DAILY
Qty: 90 CAPSULE | Refills: 3 | Status: SHIPPED | OUTPATIENT
Start: 2025-06-09

## 2025-06-09 RX ORDER — PROPRANOLOL HYDROCHLORIDE 20 MG/1
20 TABLET ORAL 2 TIMES DAILY
Qty: 60 TABLET | Refills: 5 | Status: SHIPPED | OUTPATIENT
Start: 2025-06-09 | End: 2025-12-06

## 2025-06-09 NOTE — PROGRESS NOTES
Juan Alberto Alcantara is a 79 y.o. female   Patient with a past medical history of osteoporosis on Prolia (DEXA 2024), Breast Ca followed by breast team, HLD, controlled anxiety/depression, recurrent UTI, Asthma, CHAMORRO, Mammogram due, Colonoscopy is up to date, stress incontinence, essential tremors, IBS (diarrhea), Osteopenia (DEXA 27)    Tremors got worse when cut back to once a day  Does better on twice a day    No chest pain/  SOB/ dizziness  BM OK  Energy level ok  Appetite OK             Review of Systems     Constitutional: no fever, no chills, not feeling poorly, not feeling tired and no recent weight gain, no recent weight loss.   ENT: no earache, no hearing loss, no nosebleeds, no nasal discharge, no sore throat and no hoarseness.   Cardiovascular: the heart rate was not slow, the heart rate was not fast, no chest pain, no palpitations, no intermittent leg claudication and no lower extremity edema.   Respiratory: no cough, wheezing or shortness of breath at rest or exertion  Gastrointestinal: no abdominal pain, no constipation, no melena, no nausea, no diarrhea, no vomiting and no blood in stools.   Musculoskeletal: no arthralgias, no myalgias, no back pain, no joint swelling, no joint stiffness, no limb pain and no limb swelling.   Integumentary: no skin rashes, no skin lesions, no itching, no skin wound and no dry skin.   Neurological: no headache, no confusion, no numbness, no dizziness, no tingling and no fainting.   All other systems have been reviewed and are negative for complaint.     Current Outpatient Medications   Medication Instructions    Advair Diskus 250-50 mcg/dose diskus inhaler 1 puff, 2 times daily RT    albuterol 90 mcg/actuation inhaler 2 puffs, Every 4 hours PRN    azelastine (Astelin) 137 mcg (0.1 %) nasal spray 2 sprays, 2 times daily    denosumab (Prolia) 60 mg/mL syringe Inject under the skin.    fexofenadine (Allegra) 180 mg tablet 1 tablet, Daily    FLUoxetine (PROZAC) 40 mg, oral,  Daily    LYCOPENE ORAL Centrum Silver    mometasone (Elocon) 0.1 % ointment Topical, Daily    multivitamin with minerals iron-free (Centrum Silver) Take by mouth.    nitrofurantoin, macrocrystal-monohydrate, (Macrobid) 100 mg capsule Take one tablet PRN (diarrhea) to prevent UTI    pantoprazole (ProtoNix) 40 mg EC tablet 1 tablet, Daily    propranolol (INDERAL) 20 mg, oral, 2 times daily    rosuvastatin (CRESTOR) 20 mg, oral, Daily    tirzepatide (weight loss) (ZEPBOUND) 2.5 mg, subcutaneous, Every 7 days         Vitals:    06/09/25 1109   Temp: 36.9 °C (98.4 °F)        Physical Exam     Constitutional   General appearance: Alert and in no acute distress.     Pulmonary   Respiratory assessment: No respiratory distress, normal respiratory rhythm and effort.    Auscultation of Lungs: Clear bilateral breath sounds.   Cardiovascular   Auscultation of heart: Apical pulse normal, heart rate and rhythm normal, normal S1 and S2, no murmurs and no pericardial rub.    Exam for edema: No peripheral edema.   Abdomen   Abdominal Exam: No bruits, normal bowel sounds, soft, non-tender, no abdominal mass palpated.    Liver and Spleen exam: No hepato-splenomegaly.   Musculoskeletal   Examination of gait: Normal.    Inspection of digits and nails: No clubbing or cyanosis of the fingernails.    Inspection/palpation of joints, bones and muscles: No joint swelling. Normal movement of all extremities.   Skin   Skin inspection: Normal skin color and pigmentation, normal skin turgor and no visible rash.   Neurologic   Cranial nerves: Nerves 2-12 were intact, no focal neuro defects.    Assessment/Plan          Patient with a past medical history of osteoporosis on Prolia (DEXA 2024), Breast Ca followed by breast team, HLD, controlled anxiety/depression, recurrent UTI, Asthma, CHAMORRO, Mammogram due, Colonoscopy is up to date, stress incontinence, essential tremors, IBS (diarrhea), Osteopenia (DEXA 27)    # elevated BP  Check BP at home  Bring  readings and monitor         #Essential tremors  Being helped with propranolol  Asthma has not affected  If it ever does then be will stop and try primidone     #Asthma  Allergy induced  Stable on Advair     #Dyslipidemia  Continue statins        #Osteoporosis  On Prolia  Improvement in DEXA  Scheduled for DEXA scan in the coming months     #Depression with anxiety  Stable continue home medications     #History of breast carcinoma  Scheduled for mammogram through her gynecologist     #Acid reflux  Takes PPI on occasions    # Pulmonary nodules  CT chest in 6 months

## 2025-06-13 LAB
ALBUMIN SERPL-MCNC: 4.4 G/DL (ref 3.6–5.1)
ALP SERPL-CCNC: 60 U/L (ref 37–153)
ALT SERPL-CCNC: 22 U/L (ref 6–29)
ANION GAP SERPL CALCULATED.4IONS-SCNC: 8 MMOL/L (CALC) (ref 7–17)
AST SERPL-CCNC: 22 U/L (ref 10–35)
BILIRUB SERPL-MCNC: 0.4 MG/DL (ref 0.2–1.2)
BUN SERPL-MCNC: 17 MG/DL (ref 7–25)
CALCIUM SERPL-MCNC: 9.5 MG/DL (ref 8.6–10.4)
CHLORIDE SERPL-SCNC: 105 MMOL/L (ref 98–110)
CHOLEST SERPL-MCNC: 170 MG/DL
CHOLEST/HDLC SERPL: 2.9 (CALC)
CO2 SERPL-SCNC: 26 MMOL/L (ref 20–32)
CREAT SERPL-MCNC: 0.97 MG/DL (ref 0.6–1)
EGFRCR SERPLBLD CKD-EPI 2021: 60 ML/MIN/1.73M2
ERYTHROCYTE [DISTWIDTH] IN BLOOD BY AUTOMATED COUNT: 13.5 % (ref 11–15)
EST. AVERAGE GLUCOSE BLD GHB EST-MCNC: 120 MG/DL
EST. AVERAGE GLUCOSE BLD GHB EST-SCNC: 6.6 MMOL/L
GLUCOSE SERPL-MCNC: 101 MG/DL (ref 65–99)
HBA1C MFR BLD: 5.8 %
HCT VFR BLD AUTO: 37.8 % (ref 35–45)
HDLC SERPL-MCNC: 59 MG/DL
HGB BLD-MCNC: 11.3 G/DL (ref 11.7–15.5)
LDLC SERPL CALC-MCNC: 94 MG/DL (CALC)
MCH RBC QN AUTO: 27.8 PG (ref 27–33)
MCHC RBC AUTO-ENTMCNC: 29.9 G/DL (ref 32–36)
MCV RBC AUTO: 92.9 FL (ref 80–100)
NONHDLC SERPL-MCNC: 111 MG/DL (CALC)
PLATELET # BLD AUTO: 245 THOUSAND/UL (ref 140–400)
PMV BLD REES-ECKER: 11.1 FL (ref 7.5–12.5)
POTASSIUM SERPL-SCNC: 4.7 MMOL/L (ref 3.5–5.3)
PROT SERPL-MCNC: 6.7 G/DL (ref 6.1–8.1)
RBC # BLD AUTO: 4.07 MILLION/UL (ref 3.8–5.1)
SODIUM SERPL-SCNC: 139 MMOL/L (ref 135–146)
TRIGL SERPL-MCNC: 81 MG/DL
TSH SERPL-ACNC: 2.66 MIU/L (ref 0.4–4.5)
WBC # BLD AUTO: 8.7 THOUSAND/UL (ref 3.8–10.8)

## 2025-06-13 NOTE — RESULT ENCOUNTER NOTE
Results reviewed.  All tests within normal range.  Your blood sugar was 101 and A1c was 5.8  This indicates that you have glucose intolerance  Essentially your body does not process the sugar very well  The treatment for this is weight loss  Recommend 30 minutes of walking every day and losing a few pounds  Please call if you have any questions or concerns.

## 2025-08-18 ENCOUNTER — OFFICE VISIT (OUTPATIENT)
Dept: GASTROENTEROLOGY | Facility: HOSPITAL | Age: 80
End: 2025-08-18
Payer: MEDICARE

## 2025-08-18 VITALS
BODY MASS INDEX: 29.39 KG/M2 | WEIGHT: 160.7 LBS | OXYGEN SATURATION: 94 % | HEART RATE: 72 BPM | DIASTOLIC BLOOD PRESSURE: 85 MMHG | TEMPERATURE: 97.6 F | SYSTOLIC BLOOD PRESSURE: 139 MMHG

## 2025-08-18 DIAGNOSIS — R11.0 CHRONIC NAUSEA: ICD-10-CM

## 2025-08-18 DIAGNOSIS — R14.0 ABDOMINAL BLOATING: ICD-10-CM

## 2025-08-18 DIAGNOSIS — R12 HEARTBURN: ICD-10-CM

## 2025-08-18 DIAGNOSIS — K30 FUNCTIONAL DYSPEPSIA: ICD-10-CM

## 2025-08-18 DIAGNOSIS — K76.0 FATTY LIVER: ICD-10-CM

## 2025-08-18 DIAGNOSIS — K64.9 HEMORRHOIDS, UNSPECIFIED HEMORRHOID TYPE: ICD-10-CM

## 2025-08-18 DIAGNOSIS — K58.0 IRRITABLE BOWEL SYNDROME WITH DIARRHEA: Primary | ICD-10-CM

## 2025-08-18 DIAGNOSIS — M62.89 PELVIC FLOOR DYSFUNCTION IN FEMALE: ICD-10-CM

## 2025-08-18 DIAGNOSIS — D13.0 LEIOMYOMA OF ESOPHAGUS: ICD-10-CM

## 2025-08-18 PROCEDURE — 99212 OFFICE O/P EST SF 10 MIN: CPT | Performed by: INTERNAL MEDICINE

## 2025-08-18 PROCEDURE — 99215 OFFICE O/P EST HI 40 MIN: CPT | Performed by: INTERNAL MEDICINE

## 2025-08-18 PROCEDURE — 1159F MED LIST DOCD IN RCRD: CPT | Performed by: INTERNAL MEDICINE

## 2025-08-18 PROCEDURE — G2211 COMPLEX E/M VISIT ADD ON: HCPCS | Performed by: INTERNAL MEDICINE

## 2025-08-18 PROCEDURE — 1160F RVW MEDS BY RX/DR IN RCRD: CPT | Performed by: INTERNAL MEDICINE

## 2025-08-18 PROCEDURE — 1126F AMNT PAIN NOTED NONE PRSNT: CPT | Performed by: INTERNAL MEDICINE

## 2025-08-18 ASSESSMENT — ENCOUNTER SYMPTOMS
LOSS OF SENSATION IN FEET: 0
OCCASIONAL FEELINGS OF UNSTEADINESS: 0
DEPRESSION: 0

## 2025-08-18 ASSESSMENT — PAIN SCALES - GENERAL: PAINLEVEL_OUTOF10: 0-NO PAIN

## 2025-09-12 ENCOUNTER — APPOINTMENT (OUTPATIENT)
Dept: OTOLARYNGOLOGY | Facility: CLINIC | Age: 80
End: 2025-09-12
Payer: MEDICARE

## 2025-11-11 ENCOUNTER — APPOINTMENT (OUTPATIENT)
Dept: PRIMARY CARE | Facility: CLINIC | Age: 80
End: 2025-11-11
Payer: MEDICARE